# Patient Record
Sex: FEMALE | Race: OTHER | HISPANIC OR LATINO | Employment: PART TIME | URBAN - METROPOLITAN AREA
[De-identification: names, ages, dates, MRNs, and addresses within clinical notes are randomized per-mention and may not be internally consistent; named-entity substitution may affect disease eponyms.]

---

## 2024-05-06 ENCOUNTER — APPOINTMENT (OUTPATIENT)
Dept: RADIOLOGY | Facility: MEDICAL CENTER | Age: 29
DRG: 064 | End: 2024-05-06
Attending: PSYCHIATRY & NEUROLOGY
Payer: COMMERCIAL

## 2024-05-06 ENCOUNTER — HOSPITAL ENCOUNTER (INPATIENT)
Facility: MEDICAL CENTER | Age: 29
LOS: 4 days | DRG: 064 | End: 2024-05-10
Attending: STUDENT IN AN ORGANIZED HEALTH CARE EDUCATION/TRAINING PROGRAM | Admitting: HOSPITALIST
Payer: COMMERCIAL

## 2024-05-06 DIAGNOSIS — I15.9 SECONDARY HYPERTENSION: ICD-10-CM

## 2024-05-06 DIAGNOSIS — I63.9 CEREBROVASCULAR ACCIDENT (CVA), UNSPECIFIED MECHANISM (HCC): ICD-10-CM

## 2024-05-06 PROBLEM — G43.909 MIGRAINES: Status: ACTIVE | Noted: 2024-05-06

## 2024-05-06 PROBLEM — I63.81 THALAMIC STROKE (HCC): Status: ACTIVE | Noted: 2024-05-06

## 2024-05-06 LAB
ALBUMIN SERPL BCP-MCNC: 4.3 G/DL (ref 3.2–4.9)
ALBUMIN/GLOB SERPL: 1.9 G/DL
ALP SERPL-CCNC: 55 U/L (ref 30–99)
ALT SERPL-CCNC: 12 U/L (ref 2–50)
AMPHET UR QL SCN: NEGATIVE
ANION GAP SERPL CALC-SCNC: 10 MMOL/L (ref 7–16)
APTT PPP: 27.1 SEC (ref 24.7–36)
AST SERPL-CCNC: 19 U/L (ref 12–45)
BARBITURATES UR QL SCN: NEGATIVE
BASOPHILS # BLD AUTO: 0.5 % (ref 0–1.8)
BASOPHILS # BLD: 0.04 K/UL (ref 0–0.12)
BENZODIAZ UR QL SCN: NEGATIVE
BILIRUB SERPL-MCNC: 0.8 MG/DL (ref 0.1–1.5)
BUN SERPL-MCNC: 4 MG/DL (ref 8–22)
BZE UR QL SCN: NEGATIVE
CALCIUM ALBUM COR SERPL-MCNC: 8.5 MG/DL (ref 8.5–10.5)
CALCIUM SERPL-MCNC: 8.7 MG/DL (ref 8.5–10.5)
CANNABINOIDS UR QL SCN: POSITIVE
CHLORIDE SERPL-SCNC: 107 MMOL/L (ref 96–112)
CO2 SERPL-SCNC: 21 MMOL/L (ref 20–33)
CREAT SERPL-MCNC: 0.49 MG/DL (ref 0.5–1.4)
EOSINOPHIL # BLD AUTO: 0.11 K/UL (ref 0–0.51)
EOSINOPHIL NFR BLD: 1.4 % (ref 0–6.9)
ERYTHROCYTE [DISTWIDTH] IN BLOOD BY AUTOMATED COUNT: 39.2 FL (ref 35.9–50)
EST. AVERAGE GLUCOSE BLD GHB EST-MCNC: 100 MG/DL
FENTANYL UR QL: NEGATIVE
GFR SERPLBLD CREATININE-BSD FMLA CKD-EPI: 130 ML/MIN/1.73 M 2
GLOBULIN SER CALC-MCNC: 2.3 G/DL (ref 1.9–3.5)
GLUCOSE SERPL-MCNC: 86 MG/DL (ref 65–99)
HBA1C MFR BLD: 5.1 % (ref 4–5.6)
HCT VFR BLD AUTO: 39 % (ref 37–47)
HGB BLD-MCNC: 14 G/DL (ref 12–16)
IMM GRANULOCYTES # BLD AUTO: 0.04 K/UL (ref 0–0.11)
IMM GRANULOCYTES NFR BLD AUTO: 0.5 % (ref 0–0.9)
INR PPP: 1.01 (ref 0.87–1.13)
LYMPHOCYTES # BLD AUTO: 1.9 K/UL (ref 1–4.8)
LYMPHOCYTES NFR BLD: 23.7 % (ref 22–41)
MAGNESIUM SERPL-MCNC: 1.9 MG/DL (ref 1.5–2.5)
MCH RBC QN AUTO: 31 PG (ref 27–33)
MCHC RBC AUTO-ENTMCNC: 35.9 G/DL (ref 32.2–35.5)
MCV RBC AUTO: 86.3 FL (ref 81.4–97.8)
METHADONE UR QL SCN: NEGATIVE
MONOCYTES # BLD AUTO: 0.61 K/UL (ref 0–0.85)
MONOCYTES NFR BLD AUTO: 7.6 % (ref 0–13.4)
NEUTROPHILS # BLD AUTO: 5.31 K/UL (ref 1.82–7.42)
NEUTROPHILS NFR BLD: 66.3 % (ref 44–72)
NRBC # BLD AUTO: 0 K/UL
NRBC BLD-RTO: 0 /100 WBC (ref 0–0.2)
OPIATES UR QL SCN: NEGATIVE
OXYCODONE UR QL SCN: NEGATIVE
PCP UR QL SCN: NEGATIVE
PHOSPHATE SERPL-MCNC: 3.1 MG/DL (ref 2.5–4.5)
PLATELET # BLD AUTO: 214 K/UL (ref 164–446)
PMV BLD AUTO: 11.2 FL (ref 9–12.9)
POTASSIUM SERPL-SCNC: 3.2 MMOL/L (ref 3.6–5.5)
PROPOXYPH UR QL SCN: NEGATIVE
PROT SERPL-MCNC: 6.6 G/DL (ref 6–8.2)
PROTHROMBIN TIME: 13.4 SEC (ref 12–14.6)
RBC # BLD AUTO: 4.52 M/UL (ref 4.2–5.4)
SODIUM SERPL-SCNC: 138 MMOL/L (ref 135–145)
UFH PPP CHRO-ACNC: <0.1 IU/ML
WBC # BLD AUTO: 8 K/UL (ref 4.8–10.8)

## 2024-05-06 PROCEDURE — 99223 1ST HOSP IP/OBS HIGH 75: CPT | Performed by: HOSPITALIST

## 2024-05-06 PROCEDURE — 99358 PROLONG SERVICE W/O CONTACT: CPT | Performed by: HOSPITALIST

## 2024-05-06 PROCEDURE — 99222 1ST HOSP IP/OBS MODERATE 55: CPT | Performed by: PSYCHIATRY & NEUROLOGY

## 2024-05-06 RX ORDER — POTASSIUM CHLORIDE 20 MEQ/1
40 TABLET, EXTENDED RELEASE ORAL ONCE
Status: DISCONTINUED | OUTPATIENT
Start: 2024-05-06 | End: 2024-05-07

## 2024-05-06 RX ORDER — LABETALOL HYDROCHLORIDE 5 MG/ML
10 INJECTION, SOLUTION INTRAVENOUS EVERY 4 HOURS PRN
Status: DISCONTINUED | OUTPATIENT
Start: 2024-05-06 | End: 2024-05-10 | Stop reason: HOSPADM

## 2024-05-06 RX ORDER — HEPARIN SODIUM 5000 [USP'U]/100ML
0-30 INJECTION, SOLUTION INTRAVENOUS CONTINUOUS
Status: DISCONTINUED | OUTPATIENT
Start: 2024-05-06 | End: 2024-05-08

## 2024-05-06 RX ADMIN — IOHEXOL 80 ML: 350 INJECTION, SOLUTION INTRAVENOUS at 22:45

## 2024-05-06 RX ADMIN — HEPARIN SODIUM 12 UNITS/KG/HR: 5000 INJECTION, SOLUTION INTRAVENOUS at 22:00

## 2024-05-06 ASSESSMENT — ENCOUNTER SYMPTOMS
DIAPHORESIS: 0
DEPRESSION: 0
BLOOD IN STOOL: 0
NECK PAIN: 0
ORTHOPNEA: 0
FALLS: 0
WEAKNESS: 1
WHEEZING: 0
DIARRHEA: 0
BACK PAIN: 0
TREMORS: 0
HALLUCINATIONS: 0
HEADACHES: 1
SINUS PAIN: 0
HEARTBURN: 0
PALPITATIONS: 0
FLANK PAIN: 0
SORE THROAT: 0
VOMITING: 0
FEVER: 0
ABDOMINAL PAIN: 0
CONSTIPATION: 0
DOUBLE VISION: 0
POLYDIPSIA: 0
SENSORY CHANGE: 1
HEMOPTYSIS: 0
BLURRED VISION: 0
EYE PAIN: 0
TINGLING: 0
COUGH: 0
BRUISES/BLEEDS EASILY: 0
DIZZINESS: 1
CLAUDICATION: 0
NAUSEA: 0
PHOTOPHOBIA: 0
SHORTNESS OF BREATH: 0
STRIDOR: 0
MYALGIAS: 0
SPUTUM PRODUCTION: 0
CHILLS: 0
PND: 0

## 2024-05-06 ASSESSMENT — COGNITIVE AND FUNCTIONAL STATUS - GENERAL
WALKING IN HOSPITAL ROOM: A LITTLE
MOVING TO AND FROM BED TO CHAIR: A LITTLE
STANDING UP FROM CHAIR USING ARMS: A LITTLE
EATING MEALS: A LITTLE
DAILY ACTIVITIY SCORE: 20
HELP NEEDED FOR BATHING: A LITTLE
CLIMB 3 TO 5 STEPS WITH RAILING: A LITTLE
SUGGESTED CMS G CODE MODIFIER DAILY ACTIVITY: CJ
MOVING FROM LYING ON BACK TO SITTING ON SIDE OF FLAT BED: A LITTLE
PERSONAL GROOMING: A LITTLE
SUGGESTED CMS G CODE MODIFIER MOBILITY: CK
DRESSING REGULAR LOWER BODY CLOTHING: A LITTLE
MOBILITY SCORE: 19

## 2024-05-06 ASSESSMENT — LIFESTYLE VARIABLES
AVERAGE NUMBER OF DAYS PER WEEK YOU HAVE A DRINK CONTAINING ALCOHOL: 0
EVER FELT BAD OR GUILTY ABOUT YOUR DRINKING: NO
ON A TYPICAL DAY WHEN YOU DRINK ALCOHOL HOW MANY DRINKS DO YOU HAVE: 0
EVER HAD A DRINK FIRST THING IN THE MORNING TO STEADY YOUR NERVES TO GET RID OF A HANGOVER: NO
CONSUMPTION TOTAL: NEGATIVE
TOTAL SCORE: 0
TOTAL SCORE: 0
HAVE YOU EVER FELT YOU SHOULD CUT DOWN ON YOUR DRINKING: NO
DOES PATIENT WANT TO STOP DRINKING: NO
HOW MANY TIMES IN THE PAST YEAR HAVE YOU HAD 5 OR MORE DRINKS IN A DAY: 0
ALCOHOL_USE: NO
HAVE PEOPLE ANNOYED YOU BY CRITICIZING YOUR DRINKING: NO
SUBSTANCE_ABUSE: 0
TOTAL SCORE: 0

## 2024-05-06 ASSESSMENT — PATIENT HEALTH QUESTIONNAIRE - PHQ9
SUM OF ALL RESPONSES TO PHQ9 QUESTIONS 1 AND 2: 0
1. LITTLE INTEREST OR PLEASURE IN DOING THINGS: NOT AT ALL
2. FEELING DOWN, DEPRESSED, IRRITABLE, OR HOPELESS: NOT AT ALL

## 2024-05-07 ENCOUNTER — APPOINTMENT (OUTPATIENT)
Dept: RADIOLOGY | Facility: MEDICAL CENTER | Age: 29
DRG: 064 | End: 2024-05-07
Attending: HOSPITALIST
Payer: COMMERCIAL

## 2024-05-07 ENCOUNTER — APPOINTMENT (OUTPATIENT)
Dept: CARDIOLOGY | Facility: MEDICAL CENTER | Age: 29
DRG: 064 | End: 2024-05-07
Attending: HOSPITALIST
Payer: COMMERCIAL

## 2024-05-07 ENCOUNTER — HOSPITAL ENCOUNTER (INPATIENT)
Facility: REHABILITATION | Age: 29
End: 2024-05-07
Attending: HOSPITALIST | Admitting: HOSPITALIST

## 2024-05-07 PROBLEM — I10 HYPERTENSION: Status: ACTIVE | Noted: 2024-05-07

## 2024-05-07 PROBLEM — E87.6 HYPOKALEMIA: Status: ACTIVE | Noted: 2024-05-07

## 2024-05-07 LAB
ALBUMIN SERPL BCP-MCNC: 4.1 G/DL (ref 3.2–4.9)
ALBUMIN/GLOB SERPL: 1.9 G/DL
ALP SERPL-CCNC: 50 U/L (ref 30–99)
ALT SERPL-CCNC: 11 U/L (ref 2–50)
ANION GAP SERPL CALC-SCNC: 11 MMOL/L (ref 7–16)
AST SERPL-CCNC: 18 U/L (ref 12–45)
BASOPHILS # BLD AUTO: 0.4 % (ref 0–1.8)
BASOPHILS # BLD: 0.03 K/UL (ref 0–0.12)
BILIRUB SERPL-MCNC: 0.9 MG/DL (ref 0.1–1.5)
BUN SERPL-MCNC: 4 MG/DL (ref 8–22)
CALCIUM ALBUM COR SERPL-MCNC: 8.4 MG/DL (ref 8.5–10.5)
CALCIUM SERPL-MCNC: 8.5 MG/DL (ref 8.5–10.5)
CHLORIDE SERPL-SCNC: 107 MMOL/L (ref 96–112)
CHOLEST SERPL-MCNC: 121 MG/DL (ref 100–199)
CO2 SERPL-SCNC: 20 MMOL/L (ref 20–33)
CREAT SERPL-MCNC: 0.44 MG/DL (ref 0.5–1.4)
EOSINOPHIL # BLD AUTO: 0.1 K/UL (ref 0–0.51)
EOSINOPHIL NFR BLD: 1.4 % (ref 0–6.9)
ERYTHROCYTE [DISTWIDTH] IN BLOOD BY AUTOMATED COUNT: 39.5 FL (ref 35.9–50)
GFR SERPLBLD CREATININE-BSD FMLA CKD-EPI: 133 ML/MIN/1.73 M 2
GLOBULIN SER CALC-MCNC: 2.2 G/DL (ref 1.9–3.5)
GLUCOSE SERPL-MCNC: 87 MG/DL (ref 65–99)
HCT VFR BLD AUTO: 39.2 % (ref 37–47)
HDLC SERPL-MCNC: 61 MG/DL
HGB BLD-MCNC: 14.2 G/DL (ref 12–16)
IMM GRANULOCYTES # BLD AUTO: 0.02 K/UL (ref 0–0.11)
IMM GRANULOCYTES NFR BLD AUTO: 0.3 % (ref 0–0.9)
LDLC SERPL CALC-MCNC: 53 MG/DL
LV EJECT FRACT MOD 2C 99903: 72.63
LV EJECT FRACT MOD 4C 99902: 59.11
LV EJECT FRACT MOD BP 99901: 67.33
LYMPHOCYTES # BLD AUTO: 1.86 K/UL (ref 1–4.8)
LYMPHOCYTES NFR BLD: 26.9 % (ref 22–41)
MCH RBC QN AUTO: 31.1 PG (ref 27–33)
MCHC RBC AUTO-ENTMCNC: 36.2 G/DL (ref 32.2–35.5)
MCV RBC AUTO: 85.8 FL (ref 81.4–97.8)
MONOCYTES # BLD AUTO: 0.51 K/UL (ref 0–0.85)
MONOCYTES NFR BLD AUTO: 7.4 % (ref 0–13.4)
NEUTROPHILS # BLD AUTO: 4.39 K/UL (ref 1.82–7.42)
NEUTROPHILS NFR BLD: 63.6 % (ref 44–72)
NRBC # BLD AUTO: 0 K/UL
NRBC BLD-RTO: 0 /100 WBC (ref 0–0.2)
PLATELET # BLD AUTO: 205 K/UL (ref 164–446)
PMV BLD AUTO: 11.5 FL (ref 9–12.9)
POTASSIUM SERPL-SCNC: 3.2 MMOL/L (ref 3.6–5.5)
PROT SERPL-MCNC: 6.3 G/DL (ref 6–8.2)
RBC # BLD AUTO: 4.57 M/UL (ref 4.2–5.4)
SODIUM SERPL-SCNC: 138 MMOL/L (ref 135–145)
TRIGL SERPL-MCNC: 35 MG/DL (ref 0–149)
UFH PPP CHRO-ACNC: 0.42 IU/ML
UFH PPP CHRO-ACNC: 0.44 IU/ML
WBC # BLD AUTO: 6.9 K/UL (ref 4.8–10.8)

## 2024-05-07 PROCEDURE — 99232 SBSQ HOSP IP/OBS MODERATE 35: CPT | Performed by: PSYCHIATRY & NEUROLOGY

## 2024-05-07 PROCEDURE — 93306 TTE W/DOPPLER COMPLETE: CPT | Mod: 26 | Performed by: INTERNAL MEDICINE

## 2024-05-07 PROCEDURE — 99233 SBSQ HOSP IP/OBS HIGH 50: CPT | Performed by: HOSPITALIST

## 2024-05-07 PROCEDURE — 99223 1ST HOSP IP/OBS HIGH 75: CPT | Performed by: PHYSICAL MEDICINE & REHABILITATION

## 2024-05-07 RX ORDER — POTASSIUM CHLORIDE 7.45 MG/ML
10 INJECTION INTRAVENOUS
Status: COMPLETED | OUTPATIENT
Start: 2024-05-07 | End: 2024-05-07

## 2024-05-07 RX ORDER — LOSARTAN POTASSIUM 50 MG/1
25 TABLET ORAL
Status: DISCONTINUED | OUTPATIENT
Start: 2024-05-07 | End: 2024-05-08

## 2024-05-07 RX ORDER — HYDRALAZINE HYDROCHLORIDE 20 MG/ML
20 INJECTION INTRAMUSCULAR; INTRAVENOUS EVERY 6 HOURS PRN
Status: DISCONTINUED | OUTPATIENT
Start: 2024-05-07 | End: 2024-05-10 | Stop reason: HOSPADM

## 2024-05-07 RX ADMIN — LABETALOL HYDROCHLORIDE 10 MG: 5 INJECTION INTRAVENOUS at 09:57

## 2024-05-07 RX ADMIN — POTASSIUM CHLORIDE 10 MEQ: 10 INJECTION, SOLUTION INTRAVENOUS at 10:02

## 2024-05-07 RX ADMIN — POTASSIUM CHLORIDE 10 MEQ: 10 INJECTION, SOLUTION INTRAVENOUS at 08:55

## 2024-05-07 RX ADMIN — LABETALOL HYDROCHLORIDE 10 MG: 5 INJECTION INTRAVENOUS at 05:56

## 2024-05-07 RX ADMIN — LOSARTAN POTASSIUM 25 MG: 50 TABLET, FILM COATED ORAL at 15:40

## 2024-05-07 RX ADMIN — POTASSIUM CHLORIDE 10 MEQ: 10 INJECTION, SOLUTION INTRAVENOUS at 03:55

## 2024-05-07 RX ADMIN — POTASSIUM CHLORIDE 10 MEQ: 10 INJECTION, SOLUTION INTRAVENOUS at 02:10

## 2024-05-07 ASSESSMENT — COGNITIVE AND FUNCTIONAL STATUS - GENERAL
DAILY ACTIVITIY SCORE: 21
SUGGESTED CMS G CODE MODIFIER DAILY ACTIVITY: CJ
HELP NEEDED FOR BATHING: A LITTLE
MOVING TO AND FROM BED TO CHAIR: A LITTLE
TOILETING: A LITTLE
SUGGESTED CMS G CODE MODIFIER MOBILITY: CJ
CLIMB 3 TO 5 STEPS WITH RAILING: A LITTLE
WALKING IN HOSPITAL ROOM: A LITTLE
DRESSING REGULAR LOWER BODY CLOTHING: A LITTLE
STANDING UP FROM CHAIR USING ARMS: A LITTLE
MOBILITY SCORE: 20

## 2024-05-07 ASSESSMENT — ENCOUNTER SYMPTOMS
BLURRED VISION: 1
FEVER: 0
FOCAL WEAKNESS: 1
HEADACHES: 1

## 2024-05-07 ASSESSMENT — GAIT ASSESSMENTS: GAIT LEVEL OF ASSIST: UNABLE TO PARTICIPATE

## 2024-05-07 ASSESSMENT — PAIN DESCRIPTION - PAIN TYPE
TYPE: ACUTE PAIN
TYPE: ACUTE PAIN

## 2024-05-07 ASSESSMENT — ACTIVITIES OF DAILY LIVING (ADL): TOILETING: INDEPENDENT

## 2024-05-07 ASSESSMENT — FIBROSIS 4 INDEX: FIB4 SCORE: 0.79

## 2024-05-07 NOTE — PROGRESS NOTES
Order for Xa relseased at 1200. This RN called the phlebotomist who says she received the order and will be there when she can.     1323 Xa drawn at 1239. Called to get an update. Sample is still being processed.

## 2024-05-07 NOTE — H&P
Hospital Medicine History & Physical Note    Date of Service  5/6/2024    Primary Care Physician  No primary care provider on file.    Consultants  neurology    Specialist Names:     Code Status  Full Code    Chief Complaint  Right-sided weakness    History of Presenting Illness  Charisma Pablo is a 28 y.o. female who presented 5/6/2024 with past medical history of migraines who presents to the hospital for right-sided weakness, right-sided numbness, right-sided facial weakness, confusion, blurred vision and headaches that started.  This started at 11 AM when she was found by her boyfriend.  Her symptoms then improved and then the started walking outside when she started to slur her words more and unable to move the right side of her body.  Originally she was having trouble opening her left eye and has some left-sided facial droop.  According to her boyfriend all of her symptoms are improving.  He denies any fever, trauma, nausea, vomiting, diarrhea.  She denies any loss consciousness.  Patient does have a family history of heart disease and stroke    Patient was at [facility] where she had a CT and CTA of the head and neck which was negative for acute occlusion.  She had a original MRI without contrast that was negative and another MRI with contrast was repeated.  She had MRI of the brain with contrast contrast that found a focus of diffusion restriction of the left thalamus most compatible with a lacunar infarct.  Patient was started on aspirin, Plavix and statins    Chest x-ray interpreted by me found no acute pulmonary process  EKG interpreted by me found normal sinus rhythm, left axis deviation    WBC 6.5, hemoglobin 14.9, platelets 233, PT 9.9, INR 1.0, PTT 24.3, sodium 140, potassium 3.5, chloride 108, AST 18, ALT 17, BUN 7, creatinine 0.74, glucose 96, calcium 9.4, troponin 1.01, hCG negative, alcohol level negative    I discussed the plan of care with patient.    Review of  Systems  Review of Systems   Constitutional:  Negative for chills, diaphoresis, fever and malaise/fatigue.   HENT:  Negative for congestion, ear discharge, ear pain, hearing loss, nosebleeds, sinus pain, sore throat and tinnitus.    Eyes:  Negative for blurred vision, double vision, photophobia and pain.   Respiratory:  Negative for cough, hemoptysis, sputum production, shortness of breath, wheezing and stridor.    Cardiovascular:  Negative for chest pain, palpitations, orthopnea, claudication, leg swelling and PND.   Gastrointestinal:  Negative for abdominal pain, blood in stool, constipation, diarrhea, heartburn, melena, nausea and vomiting.   Genitourinary:  Negative for dysuria, flank pain, frequency, hematuria and urgency.   Musculoskeletal:  Negative for back pain, falls, joint pain, myalgias and neck pain.   Skin:  Negative for itching and rash.   Neurological:  Positive for dizziness, sensory change, weakness and headaches. Negative for tingling and tremors.   Endo/Heme/Allergies:  Negative for environmental allergies and polydipsia. Does not bruise/bleed easily.   Psychiatric/Behavioral:  Negative for depression, hallucinations, substance abuse and suicidal ideas.        Past Medical History  Migraines    Surgical History  Surgical history reviewed is not pertinent    Family History  Family history reviewed with patient. There is no family history that is pertinent to the chief complaint.     Social History  Non-smoker, nonalcoholic    Allergies  NKDA    Medications  None       Physical Exam  Temp:  [36.4 °C (97.6 °F)] 36.4 °C (97.6 °F)  Pulse:  [58] 58  Resp:  [18] 18  BP: (141)/(101) 141/101  SpO2:  [95 %] 95 %  Blood Pressure: (!) 141/101 (RN notified )   Temperature: 36.4 °C (97.6 °F)   Pulse: (!) 58 (RN notified )   Respiration: 18   Pulse Oximetry: 95 %       Physical Exam  Vitals and nursing note reviewed.   Constitutional:       General: She is not in acute distress.     Appearance: Normal  "appearance. She is not ill-appearing, toxic-appearing or diaphoretic.   HENT:      Head: Normocephalic and atraumatic.      Nose: No congestion or rhinorrhea.      Mouth/Throat:      Pharynx: No oropharyngeal exudate or posterior oropharyngeal erythema.   Eyes:      General: No scleral icterus.  Neck:      Vascular: No carotid bruit or JVD.   Cardiovascular:      Rate and Rhythm: Normal rate and regular rhythm.      Pulses: Normal pulses.      Heart sounds: Normal heart sounds. No murmur heard.     No friction rub. No gallop.   Pulmonary:      Effort: Pulmonary effort is normal. No respiratory distress.      Breath sounds: No stridor. No wheezing, rhonchi or rales.   Abdominal:      General: Abdomen is flat. There is no distension.      Palpations: There is no mass.      Tenderness: There is no abdominal tenderness. There is no left CVA tenderness, guarding or rebound.      Hernia: No hernia is present.   Musculoskeletal:         General: No swelling. Normal range of motion.      Cervical back: No rigidity. No muscular tenderness.      Right lower leg: No edema.      Left lower leg: No edema.   Lymphadenopathy:      Cervical: No cervical adenopathy.   Skin:     General: Skin is warm and dry.      Capillary Refill: Capillary refill takes more than 3 seconds.      Coloration: Skin is not jaundiced or pale.      Findings: No bruising or erythema.   Neurological:      Mental Status: She is alert. She is disoriented.      Cranial Nerves: Cranial nerve deficit present.      Motor: Weakness present.      Comments: Right-sided facial weakness         Laboratory:          No results for input(s): \"ALTSGPT\", \"ASTSGOT\", \"ALKPHOSPHAT\", \"TBILIRUBIN\", \"DBILIRUBIN\", \"GAMMAGT\", \"AMYLASE\", \"LIPASE\", \"ALB\", \"PREALBUMIN\", \"GLUCOSE\" in the last 72 hours.      No results for input(s): \"NTPROBNP\" in the last 72 hours.      No results for input(s): \"TROPONINT\" in the last 72 hours.    Imaging:  OUTSIDE IMAGES-MR BRAIN   Final Result    "   OUTSIDE IMAGES-MR BRAIN   Final Result      OUTSIDE IMAGES-DX CHEST   Final Result      OUTSIDE IMAGES-CT HEAD   Final Result      OUTSIDE IMAGES-CT CERVICAL SPINE   Final Result      CT-CTA HEAD WITH & W/O-POST PROCESS    (Results Pending)   CT-CTA NECK WITH & W/O-POST PROCESSING    (Results Pending)   EC-ECHOCARDIOGRAM COMPLETE W/O CONT    (Results Pending)       X-Ray:  I have personally reviewed the images and compared with prior images.  EKG:  I have personally reviewed the images and compared with prior images.    Assessment/Plan:  Justification for Admission Status  I anticipate this patient will require at least two midnights for appropriate medical management, necessitating inpatient admission because acute CVA    Patient will need a Telemetry bed on NEUROLOGY service .  The need is secondary to acute CVA.    * Thalamic stroke (HCC)- (present on admission)  Assessment & Plan  I did speak with neurology who suggested that the CTA may have a vertebral artery dissection.  Neurology recommended to start IV heparin without bolus or rebolus.  Neurology wanted to repeat a stat CTA and CTA head and neck  Patient will be placed on continuous cardiac monitoring to evaluate for any arrhythmias  Q4 hours neuro checks  Check 2-D echo  Blood pressure goal 100-160  Check TSH, lipid panel and hemoglobin A1c  PTOT and ST evaluation      Migraines  Assessment & Plan  Patient is not on a maintenance medication        VTE prophylaxis: SCDs/TEDs      I spent a total of 35 minutes of non face to face time performing additional research, reviewing medical records from transferring facility, discussing plan of care with other healthcare providers. Start time: 7 45 pm. End time: 8:20 pm

## 2024-05-07 NOTE — THERAPY
Physical Therapy   Initial Evaluation     Patient Name: Charisma Pablo  Age:  28 y.o., Sex:  female  Medical Record #: [id number]  Today's Date: 5/7/2024     Precautions  Precautions: Fall Risk;Swallow Precautions  Comments: -160    Assessment    Patient is a 28-y.o. who presented to acute on 5/6/24 as a transfer from [facility] with R-sided weakness, R-sided numbness, confusion, blurred vision, and headaches. MRI brain showed a focus of diffusion restriction in the left thalamus compatible with a lacunar infarct. Pt has PMHx significant for migraines.     Pt presents to acute physical therapy with strength deficits and decreased activity tolerance. Pt mobilized as detailed below. Pt was able to complete bed mobility activities with supervision and transferred to a chair with supervision. Pt limited in mobility today due to elevated BP outside of SBP parameters. Deferred ambulation at this time. Of note, pt was ambulating in halls with RN staff prior to visit. Given pt's age, prior level of function, and presentation, anticipate pt is capable of functional independence. Pt reported no concerns with mobility. Anticipate pt will not need further physical therapy needs following discharge. Patient will not be actively followed for physical therapy services at this time, however may be seen if requested by physician for 1 more visit within 30 days to address any discharge or equipment needs.    Plan    Physical Therapy Initial Treatment Plan   Duration: Evaluation only    DC Equipment Recommendations: Unable to determine at this time  Discharge Recommendations: Anticipate that the patient will have no further physical therapy needs after discharge from the hospital       Subjective    RN cleared pt for visit. Pt received in bed. Pt agreeable to PT.     Objective       05/07/24 0950   Cosignature   Documentation Review Approved with modifications made by preceptor in flowsheet   Charge Group   PT  "Evaluation PT Evaluation Mod   Total Time Spent   PT Total Time Yes   PT Evaluation Time Spent (Mins) 19   PT Total Time Spent (Calculated) 19    Services   Is patient using  services for this encounter? No   Precautions   Precautions Fall Risk;Swallow Precautions   Comments -160   Vitals   Pulse 71   Patient BP Position Supine   Blood Pressure (!) 158/105   Pulse Oximetry 99 %   O2 (LPM) 0   O2 Delivery Device None - Room Air   Vitals Comments BP, HR respectively: 158/105mmHg, 71 bpm supine at rest; 181/117mmHg, 74bpm seated EOB; 175/114 mmHg, 77 bpm standing at rest. Deferred further ambulation due to SBP outside parameters.   Pain 0 - 10 Group   Therapist Pain Assessment Nurse Notified;Post Activity Pain Same as Prior to Activity;0   Prior Living Situation   Housing / Facility 1 Story Apartment / Condo   Steps Into Home 0   Steps In Home 0   Rail None   Bathroom Set up Walk In Shower;Grab Bars   Equipment Owned None   Lives with - Patient's Self Care Capacity Alone and Able to Care For Self;Significant Other   Prior Level of Functional Mobility   Bed Mobility Independent   Transfer Status Independent   Ambulation Independent   Ambulation Distance Community   Assistive Devices Used None   Stairs Independent   Comments Pt works at \"[employer]\" and reports still being able to drive.   Cognition    Cognition / Consciousness WDL   Level of Consciousness Alert   Comments pleasant, cooperative   Active ROM Upper Body   Active ROM Upper Body  WDL   Comments Pt moved BUE functionally.   Active ROM Lower Body    Active ROM Lower Body  WDL   Strength Lower Body   Lower Body Strength  WDL   Comments RLE mildly weaker than LLE grossly.  (Simultaneous filing. User may not have seen previous data.)   Coordination Lower Body    Coordination Lower Body  WDL   Comments Bilateral heel-to-shin WFL   Balance Assessment   Sitting Balance (Static) Good   Sitting Balance (Dynamic) Good   Standing " Balance (Static) Fair +   Standing Balance (Dynamic) Fair   Weight Shift Sitting Good   Weight Shift Standing Good   Comments No AD used. No overt LOB.   Bed Mobility    Supine to Sit Supervised   Sit to Supine Supervised   Scooting Supervised   Rolling Supervised   Comments HOB flat, no bed rails used.   Gait Analysis   Gait Level Of Assist Unable to Participate   Weight Bearing Status No restrictions   Vision Deficits Impacting Mobility NT   Comments Deferred ambulation due to SBP outside of BP parameters. Of note, pt was ambulating with RN staff prior to visit without difficulties.   Functional Mobility   Sit to Stand Supervised   Bed, Chair, Wheelchair Transfer Supervised   Transfer Method Stand Step   Mobility Supine > EOB > stand > chair   6 Clicks Assessment - How much HELP from from another person do you currently need... (If the patient hasn't done an activity recently, how much help from another person do you think he/she would need if he/she tried?)   Turning from your back to your side while in a flat bed without using bedrails? 4   Moving from lying on your back to sitting on the side of a flat bed without using bedrails? 4   Moving to and from a bed to a chair (including a wheelchair)? 3   Standing up from a chair using your arms (e.g., wheelchair, or bedside chair)? 3   Walking in hospital room? 3   Climbing 3-5 steps with a railing? 3   6 clicks Mobility Score 20   Education Group   Education Provided Role of Physical Therapist   Role of Physical Therapist Patient Response Patient;Significant Other;Acceptance;Explanation;Verbal Demonstration   Physical Therapy Initial Treatment Plan    Duration Evaluation only   Problem List    Problems Impaired Ambulation   Anticipated Discharge Equipment and Recommendations   DC Equipment Recommendations Unable to determine at this time   Discharge Recommendations Anticipate that the patient will have no further physical therapy needs after discharge from the  hospital   Interdisciplinary Plan of Care Collaboration   IDT Collaboration with  Nursing   Patient Position at End of Therapy Seated;Chair Alarm On;Call Light within Reach;Phone within Reach;Tray Table within Reach;Family / Friend in Room   Collaboration Comments RN updated.   Session Information   Date / Session Number  5/7 - Evaluation only

## 2024-05-07 NOTE — DISCHARGE PLANNING
[facility]    Referral from: Kimberley Schulte MD   Insurance Provider on Facesheet:none noted  Potential Rehab Diagnosis: stroke NIH (6)    Chart review indicates patient may have on going medical management and may have therapy needs to possibly meet inpatient rehab facility criteria with the goal of returning to community.    D/C support: unknown     Physiatry consultation forwarded per protocol.   Would appreciate therapy evaluations once medically appropriate.    TCC following          Thank you for the referral.

## 2024-05-07 NOTE — CARE PLAN
The patient is Stable - Low risk of patient condition declining or worsening    Shift Goals  Clinical Goals: Monitor neuro status, SBP <160  Patient Goals: Rest  Family Goals: Speak to neurologist    Progress made toward(s) clinical / shift goals: Assumed care of patient at 0715. Patient is oriented to self only. Complains of blurry vision in her left eye. Q4 neuro checks in place. Speech therapy advanced her diet to regular. Family at bedside. Updated patient and family on plan of care. Bed alarm/chair alarm are on and call light is within reach.     Problem: Hemodynamic Monitoring  Goal: Patient's hemodynamics, fluid balance and neurologic status will be stable or improve  Outcome: Progressing  Note: Administered PRN blood pressure medications per MAR to maintain blood pressure less than 160 systolic.      Problem: Neuro Status  Goal: Neuro status will remain stable or improve  Outcome: Progressing  Note: Q4 hour neuro checks in place. Patient is alert and oriented to self (sometimes to place). Slight right sided facial droop noted. Right sided weakness is improved. Expressive aphasia noted. NIH completed with outgoing RN. Neuro status remained stable.      Problem: Mobility - Stroke  Goal: Patient's capacity to carry out activities will improve  Outcome: Progressing  Note: Mobilized patient to the chair and bathroom as a standby assist. Patient tolerates ambulation well.        Patient is not progressing towards the following goals: N/A

## 2024-05-07 NOTE — CONSULTS
Physical Medicine and Rehabilitation Consultation              Date of initial consultation: 5/7/2024  Requesting provider: ordered by Kimberley Schulte M.D. at 05/07/24 0722    Consulting provider: Emily Dsouza D.O.  Reason for consultation: assess for acute inpatient rehab appropriateness  LOS: 1 Day(s)    Chief complaint: right sided weakness and blurry vision    HPI: The patient is a 28 y.o.  female with a past medical history of migraines;  who presented on 5/6/2024  8:34 PM as a transfer from [facility] with right sided weakness,numbness, and changes in vision. Per documentation, patient symptoms had on set around 11AM on 5/6/24, but then improved while walking around outside. Patient then started to slur her words and her right sided weakness worsened. Upon eval at the outside hospital, CT head negative for acute findings and CTA head negative for acute occlusion. MRI brain showed a focus of diffusion restriction in the left thalamus compatible with a lacunar infarct. Patient was started on ASA + statin+ Plavix and transferred to [facility] for higher level of care.   Upon eval at [facility], neurology was consulted, patient's DAPT was stopped and patient was started on a heparin drip. repeat CTA head and neck showed a partial filling defect in the Left P1 segment with partially occlusion P1 thrombus. In the AM of 5/7, patient had worsening changes in vision and a stat CT head was obtained which showed a stable hypodensity at the left thalamic region, consistent with MRI findings of Left thalamic infarct.     Patient seen and examined at bedside with boyfriend, patient reports that she feels much better. Boyfriend reports that her right sided weakness and significantly improved. Patient denies HA, lightheadedness, dizziness. Reports vision has returned to baseline and R sided weakness improved. Patient confirms that she does not have insurance, wants to get set up with [employer].     Social Hx:  Patient  lives with her boyfriend in [facility] in a 1 story house    1 LUZ  At prior level of function patient was Independent with mobility and ADLs.     Employment: retail and   Tobacco: Denies   Alcohol: Denies   Drugs: occasional marijuana use     THERAPY:  Restrictions: Fall Risk   PT: Functional mobility   Pending     OT: ADLs  Pending     SLP:   5/7 Regular diet and thin liquids     IMAGING:  CT-HEAD W/O  Narrative: 5/7/2024 9:20 AM    HISTORY/REASON FOR EXAM:  Blurred vision. Right-sided weakness.    TECHNIQUE/EXAM DESCRIPTION AND NUMBER OF VIEWS:  CT of the head without contrast.    The study was performed on a helical multidetector CT scanner. Contiguous axial sections were obtained from the skull base through the vertex.    Up to date radiation dose reduction adjustments have been utilized to meet ALARA standards for radiation dose reduction.    COMPARISON:  CT head, 5/6/2024. MRI brain, 5/6/2024.    FINDINGS:  Stable hypodensity in the left thalamus, compatible with known left thalamic infarct. Stable symmetric prominence of the extra axial CSF spaces. The ventricular system is not dilated. Gray-white matter differentiation is otherwise preserved. Posterior   fossa structures are within normal limits. No mass or mass effect. No acute intracranial hemorrhage. Orbital structures are within normal limits. Paranasal sinuses, middle ear cavities and mastoid air cells are clear.  Impression: 1. Stable hypodensity in the left thalamus, compatible with known left thalamic infarct.  2. No other acute intracranial abnormality.        PROCEDURES:  None     PMH:  No past medical history on file.    PSH:  No past surgical history on file.    FHX:  No family history on file.    Medications:  Current Facility-Administered Medications   Medication Dose    potassium chloride (KCL) ivpb 10 mEq  10 mEq    heparin infusion 25,000 units in 500 mL 0.45% NACL  0-30 Units/kg/hr    labetalol (Normodyne/Trandate) injection 10 mg   "10 mg       Allergies:  Not on File      Physical Exam:  Vitals: BP (!) 137/98   Pulse 63   Temp 36.3 °C (97.4 °F) (Temporal)   Resp 16   Ht 1.549 m (5' 0.98\")   Wt 59.2 kg (130 lb 8.2 oz)   SpO2 99%   Gen: NAD, seated comfortably in recliner, boyfriend in room   Head:  NC/AT  Eyes/ Nose/ Mouth: PERRLA, moist mucous membranes  Cardio: RRR, good distal perfusion, warm extremities  Pulm: normal respiratory effort, no cyanosis   Abd: Soft NTND, negative borborygmi   Ext: No peripheral edema. No calf tenderness. No clubbing.    Mental status:  A&Ox4 (person, place, date, situation), states the year as 2023, but attempts to self correct,  answers questions appropriately follows commands  Speech: fluent, no aphasia or dysarthria, has some difficulty name hospital, but is able to pick it correctly from a list     CRANIAL NERVES:  2,3: visual acuity grossly intact, PERRL  3,4,6: EOMI bilaterally, no nystagmus or diplopia  5: sensation intact to light touch bilaterally and symmetric  7: no obvious facial asymmetry  8: hearing grossly intact    Motor:      Upper Extremity  Myotome R L   Shoulder flexion C5 5/5 5/5   Elbow flexion C5 5/5 5/5   Wrist extension C6 5/5 5/5   Elbow extension C7 5/5 5/5   Finger flexion C8 5/5 5/5   Finger abduction T1 5/5 5/5     Lower Extremity Myotome R L   Hip flexion L2 5/5 5/5   Knee extension L3 5/5 5/5   Ankle dorsiflexion L4 5/5 5/5   Toe extension L5 5/5 5/5   Ankle plantarflexion S1 5/5 5/5       Negative Pronator drift bilaterally    Sensory:   intact to light touch through out    DTRs: 2+ in bilateral  biceps  No clonus at bilateral ankles  Negative White b/l     Tone: no spasticity noted    Coordination:   intact finger to nose bilaterally      Labs: Reviewed and significant for   Recent Labs     05/06/24 2144 05/07/24  0058   RBC 4.52 4.57   HEMOGLOBIN 14.0 14.2   HEMATOCRIT 39.0 39.2   PLATELETCT 214 205   PROTHROMBTM 13.4  --    APTT 27.1  --    INR 1.01  --      Recent " Labs     05/06/24  2144 05/07/24  0058   SODIUM 138 138   POTASSIUM 3.2* 3.2*   CHLORIDE 107 107   CO2 21 20   GLUCOSE 86 87   BUN 4* 4*   CREATININE 0.49* 0.44*   CALCIUM 8.7 8.5     Recent Results (from the past 24 hour(s))   CBC WITH DIFFERENTIAL    Collection Time: 05/06/24  9:44 PM   Result Value Ref Range    WBC 8.0 4.8 - 10.8 K/uL    RBC 4.52 4.20 - 5.40 M/uL    Hemoglobin 14.0 12.0 - 16.0 g/dL    Hematocrit 39.0 37.0 - 47.0 %    MCV 86.3 81.4 - 97.8 fL    MCH 31.0 27.0 - 33.0 pg    MCHC 35.9 (H) 32.2 - 35.5 g/dL    RDW 39.2 35.9 - 50.0 fL    Platelet Count 214 164 - 446 K/uL    MPV 11.2 9.0 - 12.9 fL    Neutrophils-Polys 66.30 44.00 - 72.00 %    Lymphocytes 23.70 22.00 - 41.00 %    Monocytes 7.60 0.00 - 13.40 %    Eosinophils 1.40 0.00 - 6.90 %    Basophils 0.50 0.00 - 1.80 %    Immature Granulocytes 0.50 0.00 - 0.90 %    Nucleated RBC 0.00 0.00 - 0.20 /100 WBC    Neutrophils (Absolute) 5.31 1.82 - 7.42 K/uL    Lymphs (Absolute) 1.90 1.00 - 4.80 K/uL    Monos (Absolute) 0.61 0.00 - 0.85 K/uL    Eos (Absolute) 0.11 0.00 - 0.51 K/uL    Baso (Absolute) 0.04 0.00 - 0.12 K/uL    Immature Granulocytes (abs) 0.04 0.00 - 0.11 K/uL    NRBC (Absolute) 0.00 K/uL   Comp Metabolic Panel    Collection Time: 05/06/24  9:44 PM   Result Value Ref Range    Sodium 138 135 - 145 mmol/L    Potassium 3.2 (L) 3.6 - 5.5 mmol/L    Chloride 107 96 - 112 mmol/L    Co2 21 20 - 33 mmol/L    Anion Gap 10.0 7.0 - 16.0    Glucose 86 65 - 99 mg/dL    Bun 4 (L) 8 - 22 mg/dL    Creatinine 0.49 (L) 0.50 - 1.40 mg/dL    Calcium 8.7 8.5 - 10.5 mg/dL    Correct Calcium 8.5 8.5 - 10.5 mg/dL    AST(SGOT) 19 12 - 45 U/L    ALT(SGPT) 12 2 - 50 U/L    Alkaline Phosphatase 55 30 - 99 U/L    Total Bilirubin 0.8 0.1 - 1.5 mg/dL    Albumin 4.3 3.2 - 4.9 g/dL    Total Protein 6.6 6.0 - 8.2 g/dL    Globulin 2.3 1.9 - 3.5 g/dL    A-G Ratio 1.9 g/dL   MAGNESIUM    Collection Time: 05/06/24  9:44 PM   Result Value Ref Range    Magnesium 1.9 1.5 - 2.5 mg/dL    PHOSPHORUS    Collection Time: 05/06/24  9:44 PM   Result Value Ref Range    Phosphorus 3.1 2.5 - 4.5 mg/dL   APTT    Collection Time: 05/06/24  9:44 PM   Result Value Ref Range    APTT 27.1 24.7 - 36.0 sec   Prothrombin Time    Collection Time: 05/06/24  9:44 PM   Result Value Ref Range    PT 13.4 12.0 - 14.6 sec    INR 1.01 0.87 - 1.13   Heparin Anti-Xa    Collection Time: 05/06/24  9:44 PM   Result Value Ref Range    Heparin Xa (UFH) <0.10 IU/mL   HEMOGLOBIN A1C    Collection Time: 05/06/24  9:44 PM   Result Value Ref Range    Glycohemoglobin 5.1 4.0 - 5.6 %    Est Avg Glucose 100 mg/dL   ESTIMATED GFR    Collection Time: 05/06/24  9:44 PM   Result Value Ref Range    GFR (CKD-EPI) 130 >60 mL/min/1.73 m 2   URINE DRUG SCREEN    Collection Time: 05/06/24 10:24 PM   Result Value Ref Range    Amphetamines Urine Negative Negative    Barbiturates Negative Negative    Benzodiazepines Negative Negative    Cocaine Metabolite Negative Negative    Fentanyl, Urine Negative Negative    Methadone Negative Negative    Opiates Negative Negative    Oxycodone Negative Negative    Phencyclidine -Pcp Negative Negative    Propoxyphene Negative Negative    Cannabinoid Metab Positive (A) Negative   Lipid Profile    Collection Time: 05/07/24 12:58 AM   Result Value Ref Range    Cholesterol,Tot 121 100 - 199 mg/dL    Triglycerides 35 0 - 149 mg/dL    HDL 61 >=40 mg/dL    LDL 53 <100 mg/dL   CBC with Differential    Collection Time: 05/07/24 12:58 AM   Result Value Ref Range    WBC 6.9 4.8 - 10.8 K/uL    RBC 4.57 4.20 - 5.40 M/uL    Hemoglobin 14.2 12.0 - 16.0 g/dL    Hematocrit 39.2 37.0 - 47.0 %    MCV 85.8 81.4 - 97.8 fL    MCH 31.1 27.0 - 33.0 pg    MCHC 36.2 (H) 32.2 - 35.5 g/dL    RDW 39.5 35.9 - 50.0 fL    Platelet Count 205 164 - 446 K/uL    MPV 11.5 9.0 - 12.9 fL    Neutrophils-Polys 63.60 44.00 - 72.00 %    Lymphocytes 26.90 22.00 - 41.00 %    Monocytes 7.40 0.00 - 13.40 %    Eosinophils 1.40 0.00 - 6.90 %    Basophils 0.40  0.00 - 1.80 %    Immature Granulocytes 0.30 0.00 - 0.90 %    Nucleated RBC 0.00 0.00 - 0.20 /100 WBC    Neutrophils (Absolute) 4.39 1.82 - 7.42 K/uL    Lymphs (Absolute) 1.86 1.00 - 4.80 K/uL    Monos (Absolute) 0.51 0.00 - 0.85 K/uL    Eos (Absolute) 0.10 0.00 - 0.51 K/uL    Baso (Absolute) 0.03 0.00 - 0.12 K/uL    Immature Granulocytes (abs) 0.02 0.00 - 0.11 K/uL    NRBC (Absolute) 0.00 K/uL   Comp Metabolic Panel (CMP)    Collection Time: 05/07/24 12:58 AM   Result Value Ref Range    Sodium 138 135 - 145 mmol/L    Potassium 3.2 (L) 3.6 - 5.5 mmol/L    Chloride 107 96 - 112 mmol/L    Co2 20 20 - 33 mmol/L    Anion Gap 11.0 7.0 - 16.0    Glucose 87 65 - 99 mg/dL    Bun 4 (L) 8 - 22 mg/dL    Creatinine 0.44 (L) 0.50 - 1.40 mg/dL    Calcium 8.5 8.5 - 10.5 mg/dL    Correct Calcium 8.4 (L) 8.5 - 10.5 mg/dL    AST(SGOT) 18 12 - 45 U/L    ALT(SGPT) 11 2 - 50 U/L    Alkaline Phosphatase 50 30 - 99 U/L    Total Bilirubin 0.9 0.1 - 1.5 mg/dL    Albumin 4.1 3.2 - 4.9 g/dL    Total Protein 6.3 6.0 - 8.2 g/dL    Globulin 2.2 1.9 - 3.5 g/dL    A-G Ratio 1.9 g/dL   ESTIMATED GFR    Collection Time: 05/07/24 12:58 AM   Result Value Ref Range    GFR (CKD-EPI) 133 >60 mL/min/1.73 m 2   Heparin Xa (Unfractionated)    Collection Time: 05/07/24  5:15 AM   Result Value Ref Range    Heparin Xa (UFH) 0.44 IU/mL         ASSESSMENT:  Patient is a 28 y.o. female admitted with left thalamic infarct     [facility] Code / Diagnosis to Support: 0001.2 - Stroke: Right Body Involvement (Left Brain)  See DISPO details below for recommendations on appropriate level of rehab for this diagnosis.    Barriers to transfer include: Insurance authorization, TCCs to verify disposition, medical clearance and bed availability     Assessment and Plan:  Left thalamic stroke   - admitted with right sided weakness and changes in vision   - OSH CT head and  CTA head/neck negative   - MRI brain showed showed a Left thalamic stroke   - repeat CTA head/neck showed subtle  partial filling defect of the left P1 segment and a partially occlusionve thrombus   - patient switched off DAPT and started on heparin drip   - repeat CT head on 5/7 showed stable hypodensity in left thalamic region   - PT/OT pending, but deficits improved      Hypokalemia   - 5/7 K 3.2   - primary team repleted and monitoring       DISPO:  - patient is currently functioning below their level of baseline, recommend on going therapy   - patient is not a candidate for IRF due to resolution of symptoms and likely need for HH vs outpatient, also patient not a candidate due to lack of insurance ([employer]) pending   - anticipate patient will continues to make gains to be safe for home with outpatient services   - PM&R will sign off     Medical Complexity:  Left thalamic stroke   Hypokalemia   Impaired mobility and ADLs       DVT PPX: heparin drip       Thank you for allowing us to participate in the care of this patient.     Patient was seen for >80 minutes on unit/floor of which > 50% of time was spent on counseling and coordination of care regarding the above, including prognosis, risk reduction, benefits of treatment, and options for next stage of care.    Emily Dsouza D.O.   Physical Medicine and Rehabilitation     Please note that this dictation was created using voice recognition software. I have made every reasonable attempt to correct obvious errors, but there may be errors of grammar and possibly content that I did not discover before finalizing the note.

## 2024-05-07 NOTE — PROGRESS NOTES
Hospital Medicine Daily Progress Note    Date of Service  5/7/2024    Chief Complaint  Charisma Pablo is a 28 y.o. female admitted 5/6/2024 with blurred vision    Hospital Course  Charisma Pablo is a 28 y.o. female who presented 5/6/2024 with past medical history of migraines who presents to the hospital for right-sided weakness, right-sided numbness, right-sided facial weakness, confusion, blurred vision and headaches that started.  This started at 11 AM when she was found by her boyfriend.  Her symptoms then improved and then the started walking outside when she started to slur her words more and unable to move the right side of her body.  Originally she was having trouble opening her left eye and has some left-sided facial droop.  According to her boyfriend all of her symptoms are improving.  He denies any fever, trauma, nausea, vomiting, diarrhea.  She denies any loss consciousness.  Patient does have a family history of heart disease and stroke     Patient was at [facility] where she had a CT and CTA of the head and neck which was negative for acute occlusion.  She had a original MRI without contrast that was negative and another MRI with contrast was repeated.  She had MRI of the brain with contrast contrast that found a focus of diffusion restriction of the left thalamus most compatible with a lacunar infarct.  Patient was started on aspirin, Plavix and statin and transferred to our facility for higher level of care.  She was evaluated by neurology and upon review of her imaging Dr Stringer was concerned about vertebral artery dissection and the patient was started on heparin drip    Interval Problem Update    Patient complained of increased blurred vision this morning I ordered stat head CT and reviewed it negative for bleed  She remains on heparin drip I discussed with neurology Dr. Stringer  Reviewed chemistry panel potassium 3.2 I ordered repletion  I reviewed lipid panel LDL 53  I reviewed CBC  hemoglobin 14.2 platelets 205  Blood pressure is elevated I ordered as needed IV hydralazine and p.o. losartan    Total time spent today reviewing imaging and lab results examining patient discussing with consultant and updating patient on plan of care 53 minutes    I have discussed this patient's plan of care and discharge plan at IDT rounds today with Case Management, Nursing, Nursing leadership, and other members of the IDT team.    Consultants/Specialty  neurology    Code Status  Full Code    Disposition  <MEDICALLYCLEARED>  I have placed the appropriate orders for post-discharge needs.    Review of Systems  Review of Systems   Constitutional:  Negative for fever.   Eyes:  Positive for blurred vision.   Neurological:  Positive for focal weakness and headaches.        Physical Exam  Temp:  [36.1 °C (97 °F)-36.7 °C (98 °F)] 36.7 °C (98 °F)  Pulse:  [58-86] 66  Resp:  [16-18] 17  BP: (137-178)/() 146/88  SpO2:  [95 %-99 %] 99 %    Physical Exam  Vitals and nursing note reviewed.   Constitutional:       General: She is not in acute distress.  HENT:      Head: Normocephalic and atraumatic.      Nose: Nose normal. No rhinorrhea.      Mouth/Throat:      Pharynx: No oropharyngeal exudate or posterior oropharyngeal erythema.   Eyes:      General:         Right eye: No discharge.         Left eye: No discharge.   Cardiovascular:      Rate and Rhythm: Normal rate and regular rhythm.      Heart sounds: Normal heart sounds. No murmur heard.     No friction rub. No gallop.   Pulmonary:      Effort: Pulmonary effort is normal. No respiratory distress.      Breath sounds: Normal breath sounds. No stridor. No rhonchi or rales.   Chest:      Chest wall: No tenderness.   Abdominal:      General: Bowel sounds are normal. There is no distension.      Palpations: Abdomen is soft. There is no mass.      Tenderness: There is no abdominal tenderness. There is no rebound.   Musculoskeletal:         General: No swelling or  tenderness.      Cervical back: Neck supple. No rigidity.   Skin:     General: Skin is warm and dry.      Coloration: Skin is not cyanotic or jaundiced.      Nails: There is no clubbing.   Neurological:      Mental Status: She is alert and oriented to person, place, and time.      Cranial Nerves: Cranial nerve deficit present.      Motor: Weakness present.      Comments: Mild right facial droop  Right hemiparesis 4+/5   Psychiatric:         Mood and Affect: Mood normal.         Behavior: Behavior normal.         Fluids    Intake/Output Summary (Last 24 hours) at 5/7/2024 1437  Last data filed at 5/7/2024 1000  Gross per 24 hour   Intake 560 ml   Output --   Net 560 ml       Laboratory  Recent Labs     05/06/24 2144 05/07/24  0058   WBC 8.0 6.9   RBC 4.52 4.57   HEMOGLOBIN 14.0 14.2   HEMATOCRIT 39.0 39.2   MCV 86.3 85.8   MCH 31.0 31.1   MCHC 35.9* 36.2*   RDW 39.2 39.5   PLATELETCT 214 205   MPV 11.2 11.5     Recent Labs     05/06/24 2144 05/07/24  0058   SODIUM 138 138   POTASSIUM 3.2* 3.2*   CHLORIDE 107 107   CO2 21 20   GLUCOSE 86 87   BUN 4* 4*   CREATININE 0.49* 0.44*   CALCIUM 8.7 8.5     Recent Labs     05/06/24 2144   APTT 27.1   INR 1.01         Recent Labs     05/07/24 0058   TRIGLYCERIDE 35   HDL 61   LDL 53       Imaging  CT-HEAD W/O   Final Result      1. Stable hypodensity in the left thalamus, compatible with known left thalamic infarct.   2. No other acute intracranial abnormality.         CT-CTA NECK WITH & W/O-POST PROCESSING   Final Result         1.  CT angiogram of the neck within normal limits.         CT-CTA HEAD WITH & W/O-POST PROCESS   Final Result   Addendum (preliminary) 1 of 1   Addendum: There is subtle partial filling defect of the left P1 segment,    appearance favoring partially occlusive P1 thrombus.      These findings were discussed with the patient's clinician, Hitesh Stringer,    on 5/6/2024 11:25 PM.      Final         1.  No large vessel occlusion or aneurysm identified.       OUTSIDE IMAGES-MR BRAIN   Final Result      OUTSIDE IMAGES-MR BRAIN   Final Result      OUTSIDE IMAGES-DX CHEST   Final Result      OUTSIDE IMAGES-CT HEAD   Final Result      OUTSIDE IMAGES-CT CERVICAL SPINE   Final Result      EC-ECHOCARDIOGRAM COMPLETE W/O CONT    (Results Pending)        Assessment/Plan  * Thalamic stroke (HCC)- (present on admission)  Assessment & Plan  I discussed with neurology CT concerning for vertebral artery dissection.    Continue heparin drip  Continue close clinical monitoring with serial neurologic exams  PT OT SLP eval's  Follow-up on echocardiogram  Zio patch on discharge    Hypokalemia  Assessment & Plan  I ordered p.o. repletion and repeat levels    Hypertension  Assessment & Plan  Start losartan  Continue as needed hydralazine and labetalol  Goal -160    Migraines  Assessment & Plan  Patient is not on a maintenance medication         VTE prophylaxis:    therapeutic anticoagulation with weight-based heparin gtt, pharmacy to adjust PRN      I have performed a physical exam and reviewed and updated ROS and Plan today (5/7/2024). In review of yesterday's note (5/6/2024), there are no changes except as documented above.

## 2024-05-07 NOTE — PROGRESS NOTES
"Neurology Progress Note  Neurohospitalist Service, [facility]    Referring Physician: Andrew Lara M.D.      Interval History: No acute events overnight.  Repeat CTA confirms likely L P2 subocclusive thrombus.  Improving neurologic exam    Review of systems: In addition to what is detailed in the HPI and/or updated in the interval history, all other systems reviewed and are negative.    Past Medical History, Past Surgical History and Social History reviewed and unchanged from prior    Medications:    Current Facility-Administered Medications:     potassium chloride (KCL) ivpb 10 mEq, 10 mEq, Intravenous, Q HOUR, Andrew Lara M.D.    heparin infusion 25,000 units in 500 mL 0.45% NACL, 0-30 Units/kg/hr, Intravenous, Continuous, Kimberley Schulte M.D., Last Rate: 14.2 mL/hr at 05/07/24 0712, 12 Units/kg/hr at 05/07/24 0712    labetalol (Normodyne/Trandate) injection 10 mg, 10 mg, Intravenous, Q4HRS PRN, Kimberley Schulte M.D., 10 mg at 05/07/24 0556    Physical Examination:   BP (!) 178/125 Comment: RN notified   Pulse 62   Temp 36.6 °C (97.8 °F) (Temporal)   Resp 18   Ht 1.549 m (5' 0.98\")   Wt 59.2 kg (130 lb 8.2 oz)   SpO2 99%   BMI 24.67 kg/m²       General: Patient is awake and in no acute distress  Neck: There is normal range of motion  CV: Regular rate   Extremities:  Warm, dry, and intact, without peripheral lower extremity edema    NEUROLOGICAL EXAM:       Mental status: Awake, alert and fully oriented  Speech and language: Speech is clear and fluent. More appropriate speech content this AM.  Able to follow midline and distal command  Cranial nerve exam: Visual fields are full. Extraocular muscles are intact. Mild R face droop, improved from last night  Motor exam: There is sustained antigravity with no downward drift in bilateral arms and legs.  Decreased dexterity noted with R hand  Sensory exam: Reacts to tactile in all 4 extremities, no neglect to double stim "   Coordination: No ataxia on bilateral finger-to-nose testing  Gait: Deferred due to patient preference        NIHSS: National Institutes of Health Stroke Scale     [0] 1a:Level of Consciousness           0-alert 1-drowsy   2-stupor   3-coma  [0] 1b:LOC Questions                         0-both  1-one      2-neither  [0] 1c:LOC Commands                       0-both  1-one      2-neither  [0] 2: Best Gaze                      0-nl    1-partial  2-forced  [0] 3: Visual Fields                              0-nl    1-partial  2-complete 3-bilat  [1] 4: Facial Paresis                0-nl    1-minor    2-partial  3-full  MOTOR                                              0-nl  [0] 5: Right Arm                       1-drift  [0] 6: Left Arm                                     2-some effort vs gravity  [0] 7: Right Leg                       3-no effort vs gravity  [0] 8: Left Leg                                      4-no movement                                                              x-untestable  [0] 9: Limb Ataxia                    0-abs   1-1_limb   2-2+_limbs                                                              x-untestable  [0] 10:Sensory                        0-nl    1-partial  2-dense  [1] 11:Best Language/Aphasia         0-nl    1-mild/mod 2-severe   3-mute  [0] 12:Dysarthria                     0-nl    1-mild/mod 2-severe                                                              x-untestable  [0] 13:Neglect/Inattention                   0-none  1-partial  2-complete  [2] TOTAL      Objective Data:    Labs:  Lab Results   Component Value Date/Time    PROTHROMBTM 13.4 05/06/2024 09:44 PM    INR 1.01 05/06/2024 09:44 PM      Lab Results   Component Value Date/Time    WBC 6.9 05/07/2024 12:58 AM    RBC 4.57 05/07/2024 12:58 AM    HEMOGLOBIN 14.2 05/07/2024 12:58 AM    HEMATOCRIT 39.2 05/07/2024 12:58 AM    MCV 85.8 05/07/2024 12:58 AM    MCH 31.1 05/07/2024 12:58 AM    MCHC 36.2 (H) 05/07/2024 12:58 AM     MPV 11.5 05/07/2024 12:58 AM    NEUTSPOLYS 63.60 05/07/2024 12:58 AM    LYMPHOCYTES 26.90 05/07/2024 12:58 AM    MONOCYTES 7.40 05/07/2024 12:58 AM    EOSINOPHILS 1.40 05/07/2024 12:58 AM    BASOPHILS 0.40 05/07/2024 12:58 AM      Lab Results   Component Value Date/Time    SODIUM 138 05/07/2024 12:58 AM    POTASSIUM 3.2 (L) 05/07/2024 12:58 AM    CHLORIDE 107 05/07/2024 12:58 AM    CO2 20 05/07/2024 12:58 AM    GLUCOSE 87 05/07/2024 12:58 AM    BUN 4 (L) 05/07/2024 12:58 AM    CREATININE 0.44 (L) 05/07/2024 12:58 AM      Lab Results   Component Value Date/Time    CHOLSTRLTOT 121 05/07/2024 12:58 AM    LDL 53 05/07/2024 12:58 AM    HDL 61 05/07/2024 12:58 AM    TRIGLYCERIDE 35 05/07/2024 12:58 AM       Lab Results   Component Value Date/Time    ALKPHOSPHAT 50 05/07/2024 12:58 AM    ASTSGOT 18 05/07/2024 12:58 AM    ALTSGPT 11 05/07/2024 12:58 AM    TBILIRUBIN 0.9 05/07/2024 12:58 AM        Imaging/Testing:    I interpreted and/or reviewed the patient's neuroimaging    CT-CTA NECK WITH & W/O-POST PROCESSING   Final Result         1.  CT angiogram of the neck within normal limits.         CT-CTA HEAD WITH & W/O-POST PROCESS   Final Result   Addendum (preliminary) 1 of 1   Addendum: There is subtle partial filling defect of the left P1 segment,    appearance favoring partially occlusive P1 thrombus.      These findings were discussed with the patient's clinician, Hitesh Stringer,    on 5/6/2024 11:25 PM.      Final         1.  No large vessel occlusion or aneurysm identified.      OUTSIDE IMAGES-MR BRAIN   Final Result      OUTSIDE IMAGES-MR BRAIN   Final Result      OUTSIDE IMAGES-DX CHEST   Final Result      OUTSIDE IMAGES-CT HEAD   Final Result      OUTSIDE IMAGES-CT CERVICAL SPINE   Final Result      EC-ECHOCARDIOGRAM COMPLETE W/O CONT    (Results Pending)       Assessment and Plan:  Charisma Pablo is a 28 year old woman with migraines, no other vascular risk factors, presenting with headache, R side deficits,  and aphasia, found to have an acute L thalamic stroke. Repeat CTA at [facility] redemonstrates subocclusive thrombus in L P2, possible dissection in L V3 segment.  She has been started on heparin therapy for secondary stroke prevention.  Will transition to oral anticoagulation in 1-2 days pending clinical stability.  Workup for stroke in young patient underway, but highest suspicion remains for stroke related to vertebral artery dissection.     Problem list:   Left thalamic stroke   Migraine     Plan:              - continue q4h neurochecks/NIHSS              - continue no bolus/no rebolus heparin protocol- if clinically stable over next 24 hours, will transition to oral anticoagulation              - clinical stability at normotensive BP, acutely may aim for -160, long-term goal is 110-130/60-80, currently at goal without intervention              - stroke labs:  HgbA1c 5.1 and LDL 53              - may defer statin therapy as stroke etiology unlikely athero-related process              - complete embolic workup with TTE with bubble study, and ziopatch monitoring at discharge              - f/u arterial hypercoagulable workup:  antiphospholipid panel, antithrombin III deficiency, Factor V Leiden, Prothrombin II mutation              - f/u autoimmunity screen with reflexive ELEONORA panel              - PT/OT/SLP              - on heparin for DVT chemoppx   - stroke bridge clinic follow up    The evaluation of the patient, and recommended management, was discussed with Dr. Willian Alexander. I have performed a physical exam and reviewed and updated ROS and Plan today (5/7/2024).     Hitesh Stringer MD  Vascular Neurology

## 2024-05-07 NOTE — HOSPITAL COURSE
Charisma Pablo is a 28 y.o. female who presented 5/6/2024 with past medical history of migraines who presents to the hospital for right-sided weakness, right-sided numbness, right-sided facial weakness, confusion, blurred vision and headaches that started.  This started at 11 AM when she was found by her boyfriend.  Her symptoms then improved and then the started walking outside when she started to slur her words more and unable to move the right side of her body.  Originally she was having trouble opening her left eye and has some left-sided facial droop.  According to her boyfriend all of her symptoms are improving.  He denies any fever, trauma, nausea, vomiting, diarrhea.  She denies any loss consciousness.  Patient does have a family history of heart disease and stroke     Patient was at [facility] where she had a CT and CTA of the head and neck which was negative for acute occlusion.  She had a original MRI without contrast that was negative and another MRI with contrast was repeated.  She had MRI of the brain with contrast contrast that found a focus of diffusion restriction of the left thalamus most compatible with a lacunar infarct.  Patient was started on aspirin, Plavix and statin and transferred to our facility for higher level of care.  She was evaluated by neurology and upon review of her imaging Dr Stringer was concerned about vertebral artery dissection and the patient was started on heparin drip

## 2024-05-07 NOTE — PROGRESS NOTES
Monitor Summary: SB/SR 58-62, WV 0.12, QRS 0.08, QT 0.44, with frequent trigeminal PVCs per strip from monitor room.

## 2024-05-07 NOTE — PROGRESS NOTES
Patient complaining of new onset left eye blurry vision. MD notified. Stat head CT ordered. This RN will transport the patient down. Monitor room aware.

## 2024-05-07 NOTE — PROGRESS NOTES
BRIEF ADMISSION REPORT:    This patient is being admitted to the Hospitalist Service.    Report received from RADHA Vega:Ti Reyes [facility]  Reason for Admission:thalamic stroke; Neurology   Pertinent history and findings: Patient is a 28-year-old female past medical history of migraines, who presented to the emergency department due to blurry vision and right-sided weakness.  Patient dates that approximate 11 AM this morning, she began to experience progression.  Approximately 1 PM, she began to experience right-sided weakness.  As result came to the emergency department.  CT head and angio were negative.  MRI showed thalamic infarct.  She was evaluated for thrombolytics.  She was loaded with aspirin Plavix and atorvastatin.  She has some improvement of her symptoms.  Still having persistent right-sided weakness and word finding difficulty.  Case discussed with our neurologist,  who recommended transfer   Vitals Signs reviewed and patient is stable: stable  Consults Called:consult Neurology   Hospitalist Assigned to Admit this Patient:RTOC  CODE Status:full       For any questions or concerns about this patient, please page Hospitalist Service.

## 2024-05-07 NOTE — DISCHARGE PLANNING
Please review the consult from Dr. Dsouza regarding post acute recommendations.  TCC will no longer follow.  Please reach out to myself with any questions.

## 2024-05-07 NOTE — DIETARY
Nutrition Services: Brief Update    Pt with yes to possible wound on admit screen. No wounds found in flowsheets, MD notes, and no wound team consult in place.     RD will monitor per department policy.
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
## 2024-05-07 NOTE — THERAPY
Occupational Therapy   Initial Evaluation     Patient Name: Charisma Pablo  Age:  28 y.o., Sex:  female  Medical Record #: [id number]  Today's Date: 5/7/2024     Precautions  Precautions: Fall Risk, Swallow Precautions  Comments: -160    Assessment  Patient is a 28 y.o. female with a diagnosis of acute L thalamic CVA. Additional factors influencing patient status / progress: PMHx includes migraines. During OT eval, pt demo'd ADLs and related functional mobility with SPV. Pt reports her symptoms have resolved and this is consistent with assessment and functional performance demo'd during eval. Pt's spouse is able to assist as needed. No further acute OT needs.     Plan    Occupational Therapy Initial Treatment Plan   Duration: (P) Evaluation only    DC Equipment Recommendations: (P) None  Discharge Recommendations: (P) Anticipate that the patient will have no further occupational therapy needs after discharge from the hospital     Subjective    Pt agreeable to OT eval     Objective     05/07/24 1108   Initial Contact Note    Initial Contact Note Order Received and Verified, Evaluation Only - Patient Does Not Require Further Acute Occupational Therapy at this Time.  However, May Benefit from Post Acute Therapy for Higher Level Functional Deficits.   Prior Living Situation   Prior Services Home-Independent   Housing / Facility Mobile Home   Steps Into Home 0  (ramp)   Steps In Home 0   Bathroom Set up Walk In Shower   Equipment Owned None   Lives with - Patient's Self Care Capacity Significant Other   Comments Pt reports she lives with her boyfriend who works full time   Prior Level of ADL Function   Self Feeding Independent   Grooming / Hygiene Independent   Bathing Independent   Dressing Independent   Toileting Independent   Prior Level of IADL Function   Medication Management Unable To Determine At This Time  (unknown if pt had meds to manage prior)   Laundry Independent   Kitchen Mobility Independent    Finances Independent   Home Management Independent   Shopping Independent   Prior Level Of Mobility Independent Without Device in Community   Driving / Transportation Driving Independent   Occupation (Pre-Hospital Vocational) Employed Part Time   Vitals   Pulse 86   Patient BP Position Sitting   Blood Pressure (!) 151/111   Pain   Intervention Declines   Cognition    Cognition / Consciousness WDL   Level of Consciousness Alert   Comments Pleasant and cooperative   Active ROM Upper Body   Active ROM Upper Body  WDL   Strength Upper Body   Upper Body Strength  WDL   Sensation Upper Body   Upper Extremity Sensation  WDL   Comments denies any numbness/tingling   Upper Body Muscle Tone   Upper Body Muscle Tone  WDL   Neurological Concerns   Neurological Concerns No   Coordination Upper Body   Coordination WDL   Balance Assessment   Sitting Balance (Static) Good   Sitting Balance (Dynamic) Good   Standing Balance (Static) Good   Standing Balance (Dynamic) Fair +   Weight Shift Sitting Good   Weight Shift Standing Good   Comments no AD   Bed Mobility    Sit to Supine Independent   Scooting Independent   Rolling Independent   Comments bed flat   ADL Assessment   Eating Independent   Grooming Supervision;Standing  (oral care)   Toileting   (declined need)   How much help from another person does the patient currently need...   Putting on and taking off regular lower body clothing? 3   Bathing (including washing, rinsing, and drying)? 3   Toileting, which includes using a toilet, bedpan, or urinal? 3   Putting on and taking off regular upper body clothing? 4   Taking care of personal grooming such as brushing teeth? 4   Eating meals? 4   6 Clicks Daily Activity Score 21   mRS Prior to admission   Prior to admission mRS 0   Modified Hudson (mRS)   Modified Sandrita Score 0   Functional Mobility   Sit to Stand Supervised   Bed, Chair, Wheelchair Transfer Supervised   Toilet Transfers Standby Assist   Transfer Method Stand Step    Mobility in room with no AD and SPV   Visual Perception   Comments denies any visual changes   Activity Tolerance   Sitting in Chair in chair on arrival   Standing 5 min   Education Group   Role of Occupational Therapist Patient Response Patient;Significant Other;Acceptance;Explanation;Verbal Demonstration   Occupational Therapy Initial Treatment Plan    Duration Evaluation only   Problem List   Problem List Decreased Functional Mobility   Anticipated Discharge Equipment and Recommendations   DC Equipment Recommendations None   Discharge Recommendations Anticipate that the patient will have no further occupational therapy needs after discharge from the hospital   Interdisciplinary Plan of Care Collaboration   IDT Collaboration with  Nursing   Patient Position at End of Therapy In Bed;Bed Alarm On;Call Light within Reach;Tray Table within Reach;Phone within Reach;Family / Friend in Room   Collaboration Comments RN updated   Session Information   Date / Session Number  5/7 eval only

## 2024-05-07 NOTE — PROGRESS NOTES
4 Eyes Skin Assessment Completed by DOTTIE Trimble and DOTTIE Smith.    Head WDL  Ears WDL  Nose WDL  Mouth WDL  Neck WDL  Breast/Chest WDL  Shoulder Blades WDL  Spine WDL  (R) Arm/Elbow/Hand WDL  (L) Arm/Elbow/Hand WDL  Abdomen WDL  Groin WDL  Scrotum/Coccyx/Buttocks WDL  (R) Leg WDL  (L) Leg WDL  (R) Heel/Foot/Toe WDL  (L) Heel/Foot/Toe WDL          Devices In Places Tele Box, Blood Pressure Cuff, and Pulse Ox      Interventions In Place N/A    Possible Skin Injury No    Pictures Uploaded Into Epic N/A  Wound Consult Placed N/A  RN Wound Prevention Protocol Ordered No

## 2024-05-07 NOTE — THERAPY
"Speech Language Pathology   Clinical Swallow Evaluation     Patient Name: Charisma Pablo  AGE:  28 y.o., SEX:  female  Medical Record #: [id number]  Date of Service: 2024      History of Present Illness  Charisma Pablo is a 28 y.o. female who presented 2024 for CVA. MRI brain revealed an acute L thalamic stroke.     PMHx: migraines.     Per MD in H&P: \"Chest x-ray interpreted by me found no acute pulmonary process.\"    General Information:  Vitals  O2 Delivery Device: None - Room Air  Level of Consciousness: Alert, Awake  Orientation: Self, , General place, Current month  Follows Directives: Yes      Prior Living Situation & Level of Function:  Lives with - Patient's Self Care Capacity: Alone and Able to Care For Self  Communication: WFL  Swallowing: WFL       Oral Mechanism Evaluation:  Dentition: Good, Natural dentition   Facial Symmetry: Central right facial droop  Facial Sensation: Equal     Labial Observations: WFL   Lingual Observations: Midline         Laryngeal Function:  Secretion Management: Adequate  Voice Quality: WFL      Subjective  Patient received awake, oriented to self, place, and month. Stated year was  but self corrected. Stated she was in the hospital because she hurt herself. Some word finding difficulty noted. Pt reported L sided blurry vision - RN notified. Pt denied hx of dysphagia.       Assessment  Current Method of Nutrition: NPO until cleared by speech pathology  Positioning: Gonsalez's (60-90 degrees)  Bolus Administration: Patient    O2 Delivery Device: None - Room Air  Factor(s) Affecting Performance: None  Tracheostomy : No    Swallowing Trials:  Swallowing Trials  Ice: WFL  Thin Liquid (TN0): WFL  Regular (RG7): WFL    Comments: Adequate bolus acceptance and containment. Presumed timely onset of pharyngeal swallow and bolus formation evidenced by absence of oral residue. No overt s/sx of aspiration appreciated. Voice and breath sounds remained clear. Mastication " "timely and functional with regular solids.       Clinical Impressions  No clinical signs of oropharyngeal dysphagia. Recommend initiation of regular diet with close monitoring. SLP following to ensure diet tolerance and complete orders for cognitive-linguistic evaluation, as appropriate.       Recommendations  Diet Consistency: Regular diet  Instrumentation: Instrumental swallow study pending clinical progress  Medication: Whole with liquid, As tolerated  Supervision: Independent, Assist with meal tray set up  Positioning: Fully upright and midline during oral intake  Risk Management : Small bites/sips, Slow rate of intake  Oral Care: BID       SLP Treatment Plan  Treatment Plan: Dysphagia Treatment, Patient/Family/Caregiver Training  SLP Frequency: 3x Per Week  Estimated Duration: Until Therapy Goals Met      Anticipated Discharge Needs  Discharge Recommendations: Other (Pending results of cognitive evaluation)   Therapy Recommendations Upon DC: Dysphagia Training, Patient / Family / Caregiver Education        Patient / Family Goals  Patient / Family Goal #1: \"She's been asking for water\" -SO  Short Term Goals  Short Term Goal # 1: Patient will consume a RG/TN diet with no overt s/sx of aspiration.      Uma Malave, SLP   "

## 2024-05-07 NOTE — ASSESSMENT & PLAN NOTE
Evaluated by neurology CT concerning for vertebral artery dissection.    Continue Eliquis  Continue close clinical monitoring with serial neurologic exams and if remains stable plan to discharge tomorrow morning  PT OT SLP eval's  echocardiogram normal EF negative bubble study  Zio patch on discharge
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
## 2024-05-08 LAB
ANION GAP SERPL CALC-SCNC: 10 MMOL/L (ref 7–16)
AT III ACT/NOR PPP CHRO: 108 % (ref 76–128)
BUN SERPL-MCNC: 7 MG/DL (ref 8–22)
CALCIUM SERPL-MCNC: 8.6 MG/DL (ref 8.5–10.5)
CHLORIDE SERPL-SCNC: 108 MMOL/L (ref 96–112)
CO2 SERPL-SCNC: 21 MMOL/L (ref 20–33)
CREAT SERPL-MCNC: 0.46 MG/DL (ref 0.5–1.4)
ERYTHROCYTE [DISTWIDTH] IN BLOOD BY AUTOMATED COUNT: 41 FL (ref 35.9–50)
GFR SERPLBLD CREATININE-BSD FMLA CKD-EPI: 132 ML/MIN/1.73 M 2
GLUCOSE SERPL-MCNC: 104 MG/DL (ref 65–99)
HCT VFR BLD AUTO: 37.4 % (ref 37–47)
HGB BLD-MCNC: 13.1 G/DL (ref 12–16)
MAGNESIUM SERPL-MCNC: 2 MG/DL (ref 1.5–2.5)
MCH RBC QN AUTO: 30.8 PG (ref 27–33)
MCHC RBC AUTO-ENTMCNC: 35 G/DL (ref 32.2–35.5)
MCV RBC AUTO: 88 FL (ref 81.4–97.8)
NUCLEAR IGG SER QL IA: NORMAL
PHOSPHATE SERPL-MCNC: 4.5 MG/DL (ref 2.5–4.5)
PLATELET # BLD AUTO: 194 K/UL (ref 164–446)
PMV BLD AUTO: 11.5 FL (ref 9–12.9)
POTASSIUM SERPL-SCNC: 3.7 MMOL/L (ref 3.6–5.5)
PROT C ACT/NOR PPP: 119 % (ref 83–168)
PROT S ACT/NOR PPP: 83 % (ref 57–131)
RBC # BLD AUTO: 4.25 M/UL (ref 4.2–5.4)
SODIUM SERPL-SCNC: 139 MMOL/L (ref 135–145)
UFH PPP CHRO-ACNC: 0.7 IU/ML
WBC # BLD AUTO: 5.6 K/UL (ref 4.8–10.8)

## 2024-05-08 PROCEDURE — 99233 SBSQ HOSP IP/OBS HIGH 50: CPT | Performed by: PSYCHIATRY & NEUROLOGY

## 2024-05-08 PROCEDURE — 99232 SBSQ HOSP IP/OBS MODERATE 35: CPT | Performed by: HOSPITALIST

## 2024-05-08 RX ORDER — LOSARTAN POTASSIUM 50 MG/1
50 TABLET ORAL
Status: DISCONTINUED | OUTPATIENT
Start: 2024-05-09 | End: 2024-05-10 | Stop reason: HOSPADM

## 2024-05-08 RX ADMIN — APIXABAN 5 MG: 5 TABLET, FILM COATED ORAL at 15:43

## 2024-05-08 RX ADMIN — LOSARTAN POTASSIUM 25 MG: 50 TABLET, FILM COATED ORAL at 04:33

## 2024-05-08 RX ADMIN — HEPARIN SODIUM 12 UNITS/KG/HR: 5000 INJECTION, SOLUTION INTRAVENOUS at 04:37

## 2024-05-08 ASSESSMENT — ENCOUNTER SYMPTOMS
HEADACHES: 0
SHORTNESS OF BREATH: 0
FEVER: 0
BLOOD IN STOOL: 0
BLURRED VISION: 1

## 2024-05-08 ASSESSMENT — PAIN DESCRIPTION - PAIN TYPE
TYPE: ACUTE PAIN
TYPE: ACUTE PAIN

## 2024-05-08 ASSESSMENT — FIBROSIS 4 INDEX: FIB4 SCORE: 0.79

## 2024-05-08 NOTE — PROGRESS NOTES
Neurology Progress Note  Neurohospitalist Service, [facility]    Referring Physician: Andrew Lara M.D.    No chief complaint on file.      HPI: Refer to initial documented Neurology H&P, as detailed in the patient's chart.    Interval History 5/8: No new issues.    Review of systems: In addition to what is detailed in the HPI and/or updated in the interval history, all other systems reviewed and are negative.    Past Medical History:    has no past medical history on file.    FHx:  family history is not on file.    SHx:       Medications:    Current Facility-Administered Medications:     hydrALAZINE (Apresoline) injection 20 mg, 20 mg, Intravenous, Q6HRS PRN, Andrew Lara M.D.    losartan (Cozaar) tablet 25 mg, 25 mg, Oral, Q DAY, Andrew Lara M.D., 25 mg at 05/08/24 0433    heparin infusion 25,000 units in 500 mL 0.45% NACL, 0-30 Units/kg/hr, Intravenous, Continuous, Kimberley Schulte M.D., Last Rate: 14.2 mL/hr at 05/08/24 0700, 12 Units/kg/hr at 05/08/24 0700    labetalol (Normodyne/Trandate) injection 10 mg, 10 mg, Intravenous, Q4HRS PRN, Kimberley Schulte M.D., 10 mg at 05/07/24 0957    Physical Examination:     Vitals:    05/07/24 1930 05/07/24 2309 05/08/24 0300 05/08/24 0724   BP: (!) 145/95 118/85 125/82 112/73   Pulse: 77 69 61 71   Resp: 18 18 18 16   Temp: 36.4 °C (97.6 °F) 36.7 °C (98 °F) 36.6 °C (97.9 °F) 36.4 °C (97.6 °F)   TempSrc: Temporal Temporal Temporal Temporal   SpO2: 97% 95% 94% 98%   Weight:   60.2 kg (132 lb 11.5 oz)    Height:               NEUROLOGICAL EXAM:     Patient is a very alert and oriented x 4.  Follows commands.  Repeats simple phrases.  More difficulty on more complex phrases.  Follows two-step commands.  Some hesitancy in speech.  pupils equal reactive.  Mild right facial droop.  no nystagmus.  Visual field intact.  Sustained antigravity in all 4.  Decrease sensation on the right hand compared to left.  No ataxia acutely continue to  follow supportive care    Objective Data:    Labs:  Lab Results   Component Value Date/Time    PROTHROMBTM 13.4 05/06/2024 09:44 PM    INR 1.01 05/06/2024 09:44 PM      Lab Results   Component Value Date/Time    WBC 5.6 05/08/2024 04:04 AM    RBC 4.25 05/08/2024 04:04 AM    HEMOGLOBIN 13.1 05/08/2024 04:04 AM    HEMATOCRIT 37.4 05/08/2024 04:04 AM    MCV 88.0 05/08/2024 04:04 AM    MCH 30.8 05/08/2024 04:04 AM    MCHC 35.0 05/08/2024 04:04 AM    MPV 11.5 05/08/2024 04:04 AM    NEUTSPOLYS 63.60 05/07/2024 12:58 AM    LYMPHOCYTES 26.90 05/07/2024 12:58 AM    MONOCYTES 7.40 05/07/2024 12:58 AM    EOSINOPHILS 1.40 05/07/2024 12:58 AM    BASOPHILS 0.40 05/07/2024 12:58 AM      Lab Results   Component Value Date/Time    SODIUM 139 05/08/2024 04:04 AM    POTASSIUM 3.7 05/08/2024 04:04 AM    CHLORIDE 108 05/08/2024 04:04 AM    CO2 21 05/08/2024 04:04 AM    GLUCOSE 104 (H) 05/08/2024 04:04 AM    BUN 7 (L) 05/08/2024 04:04 AM    CREATININE 0.46 (L) 05/08/2024 04:04 AM      Lab Results   Component Value Date/Time    CHOLSTRLTOT 121 05/07/2024 12:58 AM    LDL 53 05/07/2024 12:58 AM    HDL 61 05/07/2024 12:58 AM    TRIGLYCERIDE 35 05/07/2024 12:58 AM       Lab Results   Component Value Date/Time    ALKPHOSPHAT 50 05/07/2024 12:58 AM    ASTSGOT 18 05/07/2024 12:58 AM    ALTSGPT 11 05/07/2024 12:58 AM    TBILIRUBIN 0.9 05/07/2024 12:58 AM        Imaging/Testing:  EC-ECHOCARDIOGRAM COMPLETE W/O CONT   Final Result      CT-HEAD W/O   Final Result      1. Stable hypodensity in the left thalamus, compatible with known left thalamic infarct.   2. No other acute intracranial abnormality.         CT-CTA NECK WITH & W/O-POST PROCESSING   Final Result         1.  CT angiogram of the neck within normal limits.         CT-CTA HEAD WITH & W/O-POST PROCESS   Final Result   Addendum (preliminary) 1 of 1   Addendum: There is subtle partial filling defect of the left P1 segment,    appearance favoring partially occlusive P1 thrombus.      These  findings were discussed with the patient's clinician, Hitesh Stringer,    on 5/6/2024 11:25 PM.      Final         1.  No large vessel occlusion or aneurysm identified.      OUTSIDE IMAGES-MR BRAIN   Final Result      OUTSIDE IMAGES-MR BRAIN   Final Result      OUTSIDE IMAGES-DX CHEST   Final Result      OUTSIDE IMAGES-CT HEAD   Final Result      OUTSIDE IMAGES-CT CERVICAL SPINE   Final Result            Assessment and Plan:    28-year-old female presents to outside facility due to right-sided hemiparesis with aphasia found to have a left thalamic infarct.  CTA at our facility showed a left P2 subocclusive thrombus with the possibility of a left vertebral dissection at the V3 segment.  Patient is currently on heparin.  Stable examination.  Plan titration to oral anticoagulant tomorrow this afternoon.  Etiology is most likely due to left vertebral dissection.  Hypercoagulable workup is ending.    Plan:  1.  Every 4 hour neurochecks  2.  Okay to transition to all oral anticoagulation this afternoon with Eliquis.  3.  Maintain normal blood pressures.  110s to 130s systolic.  4.  Zio patch at discharge.  5.  Follow-up on hypercoagulable workup antiphospholipid panel Antithrombin, factor V, prothrombin II  6.  Follow-up in stroke clinic    Spent over 53 minutes reviewing outside imaging, CTA, MRI, examined the patient and going to care plan the primary service.    This chart was partially generated using voice recognition technology and sound alike word replacement may be present, best efforts were made to make the chart accurate.    Hitesh Lagos MD  Board Certified Neurology, ABPN  (t) [phone]

## 2024-05-08 NOTE — DISCHARGE PLANNING
Patient was referred to [facility] health worker who will see patient and provide community resources

## 2024-05-08 NOTE — CARE PLAN
The patient is Stable - Low risk of patient condition declining or worsening    Shift Goals  Clinical Goals: Monitor neuro status, <160  Patient Goals: Sleep  Family Goals: Comfort    Progress made toward(s) clinical / shift goals:  Patient is AAOx3, she is able to communicate her needs. Q4 hour neuro checks in place to monitor neuro status. Patient's BP remains stable. Bed low, locked and call light in reach.    Problem: Knowledge Deficit - Stroke Education  Goal: Patient's knowledge of stroke and risk factors will improve  Outcome: Progressing  Note: Patient educated on stroke diagnosis, medication safety, signs and symptoms of a stroke and risk factors. Patient demonstrates understanding of education provided. All questions answered.  1.  Stroke education booklet provided  2.  Education regarding EMS activation, need for follow up, medication prescribed at discharge, risk factors for stroke/lifestyle modifications, warning signs and symptoms of stroke provided     Problem: Psychosocial - Patient Condition  Goal: Patient's ability to verbalize feelings about condition will improve  Outcome: Progressing  Note: Patient is oriented x3, she is able to communicate her needs. Patient still confused about why she is in the hospital. Education provided on stroke diagnosis.     Problem: Neuro Status  Goal: Neuro status will remain stable or improve  Outcome: Progressing  Note: At shift change report, told patient was alert to self. Patient no AAOx3, she is able to articulate that she is in the hospital, the month and year, still confused about the situation.     Problem: Mobility - Stroke  Goal: Patient's capacity to carry out activities will improve  Outcome: Progressing  Note: Patient able to mobilize to the bathroom and perform ADLs, she remains free from falls.     Patient is not progressing towards the following goals: N/A

## 2024-05-08 NOTE — THERAPY
"Speech Language Pathology   Daily Treatment     Patient Name: Charisma Pablo  AGE:  28 y.o., SEX:  female  Medical Record #: [id number]  Date of Service: 5/8/2024      Precautions:  Precautions: Fall Risk       Subjective  RN cleared pt for session. Pt asleep upon arrival, easily roused. Boyfriend at bedside.       Assessment  PO of thin liquids, puree, and regular solids presented. Pt self-feeding without difficulty. Appropriate oral bolus containment and clearance. Functional mastication of solids. No overt s/sx of airway invasion across all trials. Vocal quality remained stable. No signes of esophageal dysfunction.      Clinical Impressions  Patient presents without clinical indicators concerning for oropharyngeal dysphagia. Recommend continuation of oral diet. SLP to follow likely x1 to monitor for changes given acute thalamic CVA.      Recommendations  Diet Consistency: Regular solids/thin liquids  Instrumentation: None indicated at this time  Medication: As tolerated  Supervision: Independent, Assist with meal tray set up  Positioning: Fully upright and midline during oral intake  Risk Management : Small bites/sips, Slow rate of intake  Oral Care: BID      SLP Treatment Plan  Treatment Plan: Dysphagia Treatment  SLP Frequency: 3x Per Week  Estimated Duration: Until Therapy Goals Met      Anticipated Discharge Needs  Discharge Recommendations: Recommend outpatient speech therapy services  Therapy Recommendations Upon DC: Dysphagia Training, Cognitive-Linguistic Training, Community Re-Integration, Patient / Family / Caregiver Education      Patient / Family Goals  Patient / Family Goal #1: \"She's been asking for water\" -SO  Goal #1 Outcome: Progressing as expected  Short Term Goals  Short Term Goal # 1: Patient will consume a RG/TN diet with no overt s/sx of aspiration.  Goal Outcome # 1: Progressing as expected      URBANO Stafford  "

## 2024-05-08 NOTE — DISCHARGE PLANNING
Community Health Worker Initial First Visit Intake  Social determinates of health intake complete.   Identified barriers to : Insurance.  Contact information provided to Charisma Pablo Yes  Has PCP appointment scheduled for :  Pt will call  Accepted Meds-To-Beds.    Inpatient assessment completed.    ERIS Dang met with pt (SO and family present) and introduced Community Care Management and completed SDOH. Pt lives in [place] with SO and is an active . Pt is employed and has no Insurance coverage. Pt denies any barriers with food, housing, transportation and is confident in managing her health and medications. Pending discharge disposition.     Called [facility]  [phone] and spoke with [facility] who verified that pt is pending Medi- senait coverage and provided EFRAIN # [id number].- Pt will be assigned a .   Provided all information for  and Medi-senait EFRAIN # to pt.  Will discharge pt from [facility] as all goals met and pt has no additional needs.

## 2024-05-08 NOTE — PROGRESS NOTES
[facility] Medicine Daily Progress Note    Date of Service  5/8/2024    Chief Complaint  Charisma Pablo is a 28 y.o. female admitted 5/6/2024 with blurred vision    Hospital Course  Charisma Pablo is a 28 y.o. female who presented 5/6/2024 with past medical history of migraines who presents to the hospital for right-sided weakness, right-sided numbness, right-sided facial weakness, confusion, blurred vision and headaches that started.  This started at 11 AM when she was found by her boyfriend.  Her symptoms then improved and then the started walking outside when she started to slur her words more and unable to move the right side of her body.  Originally she was having trouble opening her left eye and has some left-sided facial droop.  According to her boyfriend all of her symptoms are improving.  He denies any fever, trauma, nausea, vomiting, diarrhea.  She denies any loss consciousness.  Patient does have a family history of heart disease and stroke     Patient was at [facility] where she had a CT and CTA of the head and neck which was negative for acute occlusion.  She had a original MRI without contrast that was negative and another MRI with contrast was repeated.  She had MRI of the brain with contrast contrast that found a focus of diffusion restriction of the left thalamus most compatible with a lacunar infarct.  Patient was started on aspirin, Plavix and statin and transferred to our facility for higher level of care.  She was evaluated by neurology and upon review of her imaging Dr Stringer was concerned about vertebral artery dissection and the patient was started on heparin drip    Interval Problem Update    Patient is afebrile on room air  She remains on heparin drip  I discussed with neurology plan is to transition to [facility] this evening I discussed with pharmacist  I reviewed with patient and parents at bedside MRI findings and plan of care    I have discussed this patient's plan of care and  discharge plan at IDT rounds today with Case Management, Nursing, Nursing leadership, and other members of the IDT team.    Consultants/Specialty  neurology    Code Status  Full Code    Disposition  The patient is not medically cleared for discharge to home or a post-acute facility.  Anticipate discharge to: home with close outpatient follow-up    I have placed the appropriate orders for post-discharge needs.    Review of Systems  Review of Systems   Constitutional:  Negative for fever.   Eyes:  Positive for blurred vision.   Respiratory:  Negative for shortness of breath.    Gastrointestinal:  Negative for blood in stool.   Neurological:  Negative for headaches.        Physical Exam  Temp:  [36.4 °C (97.6 °F)-36.8 °C (98.3 °F)] 36.6 °C (97.8 °F)  Pulse:  [61-84] 84  Resp:  [16-18] 16  BP: (112-145)/(73-98) 134/94  SpO2:  [94 %-100 %] 99 %    Physical Exam  HENT:      Head: Normocephalic and atraumatic.   Cardiovascular:      Rate and Rhythm: Normal rate and regular rhythm.      Heart sounds: No murmur heard.  Pulmonary:      Effort: Pulmonary effort is normal.      Breath sounds: Normal breath sounds.   Abdominal:      General: There is no distension.      Palpations: Abdomen is soft.      Tenderness: There is no abdominal tenderness.   Musculoskeletal:         General: No swelling.   Skin:     General: Skin is warm and dry.   Neurological:      Mental Status: She is alert.      Cranial Nerves: Cranial nerve deficit present.      Motor: Weakness present.      Comments: Mild right facial droop  Right hemiparesis 4+/5   Psychiatric:         Cognition and Memory: She exhibits impaired recent memory.         Fluids    Intake/Output Summary (Last 24 hours) at 5/8/2024 1514  Last data filed at 5/7/2024 2000  Gross per 24 hour   Intake 200 ml   Output --   Net 200 ml       Laboratory  Recent Labs     05/06/24  2144 05/07/24  0058 05/08/24  0404   WBC 8.0 6.9 5.6   RBC 4.52 4.57 4.25   HEMOGLOBIN 14.0 14.2 13.1    HEMATOCRIT 39.0 39.2 37.4   MCV 86.3 85.8 88.0   MCH 31.0 31.1 30.8   MCHC 35.9* 36.2* 35.0   RDW 39.2 39.5 41.0   PLATELETCT 214 205 194   MPV 11.2 11.5 11.5     Recent Labs     05/06/24 2144 05/07/24  0058 05/08/24  0404   SODIUM 138 138 139   POTASSIUM 3.2* 3.2* 3.7   CHLORIDE 107 107 108   CO2 21 20 21   GLUCOSE 86 87 104*   BUN 4* 4* 7*   CREATININE 0.49* 0.44* 0.46*   CALCIUM 8.7 8.5 8.6     Recent Labs     05/06/24 2144   APTT 27.1   INR 1.01         Recent Labs     05/07/24  0058   TRIGLYCERIDE 35   HDL 61   LDL 53       Imaging  EC-ECHOCARDIOGRAM COMPLETE W/O CONT   Final Result      CT-HEAD W/O   Final Result      1. Stable hypodensity in the left thalamus, compatible with known left thalamic infarct.   2. No other acute intracranial abnormality.         CT-CTA NECK WITH & W/O-POST PROCESSING   Final Result         1.  CT angiogram of the neck within normal limits.         CT-CTA HEAD WITH & W/O-POST PROCESS   Final Result   Addendum (preliminary) 1 of 1   Addendum: There is subtle partial filling defect of the left P1 segment,    appearance favoring partially occlusive P1 thrombus.      These findings were discussed with the patient's clinician, Hitesh Stringer,    on 5/6/2024 11:25 PM.      Final         1.  No large vessel occlusion or aneurysm identified.      OUTSIDE IMAGES-MR BRAIN   Final Result      OUTSIDE IMAGES-MR BRAIN   Final Result      OUTSIDE IMAGES-DX CHEST   Final Result      OUTSIDE IMAGES-CT HEAD   Final Result      OUTSIDE IMAGES-CT CERVICAL SPINE   Final Result           Assessment/Plan  * Thalamic stroke (HCC)- (present on admission)  Assessment & Plan  Evaluated by neurology CT concerning for vertebral artery dissection.    Continue heparin drip and transition to Eliquis this evening  Continue close clinical monitoring with serial neurologic exams  PT OT SLP eval's  Follow-up on echocardiogram  Zio patch on discharge    Hypokalemia  Assessment & Plan  I ordered p.o. repletion and  repeat levels    Hypertension  Assessment & Plan  Start losartan  Continue as needed hydralazine and labetalol  Goal -160    Migraines  Assessment & Plan  Patient is not on a maintenance medication         VTE prophylaxis: Heparin drip    I have performed a physical exam and reviewed and updated ROS and Plan today (5/8/2024). In review of yesterday's note (5/7/2024), there are no changes except as documented above.

## 2024-05-08 NOTE — PROGRESS NOTES
Monitor summary: SR 63-92, AK 0.12, QRS 0.09, QT 0.45, with rare PVCs, per strip from monitor room.

## 2024-05-08 NOTE — THERAPY
"Speech Language Pathology   Cognitive Evaluation     Patient Name: Charisma Pablo  AGE:  28 y.o., SEX:  female  Medical Record #: [id number]  Date of Service: 2024      History of Present Illness  Charisma Pablo is a 28 y.o. female who presented 2024 for CVA. MRI brain revealed an acute L thalamic stroke.      PMHx: migraines.      Per MD in H&P: \"Chest x-ray interpreted by me found no acute pulmonary process.\"      General Information  Vitals  O2 Delivery Device: None - Room Air  Level of Consciousness: Alert, Awake   Orientation: Self,   Follows Directives: Yes - simple commands only      Prior Living Situation & Level of Function  Prior Services: Home-Independent  Housing / Facility: Mobile Home  Lives with - Patient's Self Care Capacity: Significant Other  Communication: WFL  Swallowing: [facility]       Oral Mechanism Evaluation  Dentition: Good, Natural dentition  Facial Symmetry: Central right facial droop  Facial Sensation: Equal  Labial Observations: WFL  Lingual Observations: Midline  Motor Speech: [facility]      Laryngeal Function Exam  Secretion Management: Adequate  Voice Quality: WFL      Subjective  Patient denied any acute cognitive deficits; however, pt's boyfriend endorsed noticing changes in memory. Pt agreeable to participate in SLP tasks.        Assessment  The patient was seen this date for a cognitive-linguistic evaluation. Portions of the COGNISTAT (The Neurobehavioral Cognitive Status Examination), as well as non-standardized assessments were utilized. Results are as follows:    Cognistat  Orientation: Severe   - Pt oriented to self and date of birth only. Stated \"a selective place,\" for location. Provided 2017 for date.   Attention: Average  Repetition: Average  Naming: Average  Memory: Severe   - Pt initially unable to recall words following initial presentation. However, pt recalled 4/4 words on second attempt. Following provided delay, pt unable to independently recall words. " "Accuracy did not increase with category prompts. Accuracy did increase to 2/4 with list prompts.   Calculations: Moderate   - Pt answered 1/4 questions correctly.   Similarities: Severe   - Pt provided 1/4 abstract concepts. Answers for subsequent tasks significantly off topic/incorrect.   Judgement: Severe   - Pt stated \"I don't know,\" to 2/3 questions. Stated \"run to the kitchen,\" in response to \"what would you do if a broken pipe had flooded the kitchen.\"      Informal Assessment  Command Followin-step: 3/3, 2-step: 2/3, 3-step: 1/3   - Increased difficulty with complexity. Pt forgetting final directive in 3-step commands.   Yes/No Questions: Simple: 3/3, Moderate: 3/3, Complex: 3/3      Clock Drawing  Prompt \"Draw a clock, place all of the numbers inside the [place],  and xu the hands at 10 after 11.\" Clock drawn scored as 11/13, indicating mild impairment per CLQT protocol. Errors are as follows: incorrect hand placement indicating incorrect time.      Clinical Impressions  Patient presents with moderate-severe cognitive deficits, per objective measures, suspect acute related to thalamic CVA. Formal assessments revealed mild deficits in executive functioning, moderate deficits in calculations, and severe deficits in memory and problem solving. Comprehension relatively intact however, noted increased difficulty with multi-step directions; query if complicated by memory deficits. Pt would benefit from skilled speech therapy in the acute setting and next level of care. SLP to follow.       NOTE: It is not within the scope of practice of Speech-Language Pathologists to determine patient capacity. Please defer to the physician or psych to complete this assessment.       Recommendations  Supervision Needs Upons Discharge: Direct assistance with IADLs (see below)  IADLs: Medication management, Financial management, Household chores, Appointment management, Cooking       SLP Treatment Plan  Treatment Plan: " "Dysphagia Treatment, Cognitive Treatment  SLP Frequency: 3x Per Week  Estimated Duration: Until Therapy Goals Met      Anticipated Discharge Needs  Discharge Recommendations: Recommend post-acute placement for additional speech therapy services prior to discharge home   Therapy Recommendations Upon DC: Dysphagia Training, Cognitive-Linguistic Training, Community Re-Integration, Patient / Family / Caregiver Education      Patient / Family Goals  Patient / Family Goal #1: \"She's been asking for water\" -SO  Goal #1 Outcome: Progressing as expected  Short Term Goal # 1: Patient will consume a RG/TN diet with no overt s/sx of aspiration.  Goal Outcome # 1: Progressing as expected  Short Term Goal # 2: Pt will orient x4 across all spheres with 80% accuracy with moderate assistance  Short Term Goal # 3: Pt will complete functional memory tasks with 80% accuracy and moderate cues  Short Term Goal # 4: Pt will demo appropriate problem solving and overall exectuive functional tasks with 80% accuracy and min cues      Lorna Davison, SLP  "

## 2024-05-08 NOTE — CARE PLAN
The patient is Stable - Low risk of patient condition declining or worsening    Shift Goals  Clinical Goals: Monitor neuro status, mobility  Patient Goals: Rest  Family Goals: Speak to doctors, understand symptoms of stroke    Progress made toward(s) clinical / shift goals:      Problem: Neuro Status  Goal: Neuro status will remain stable or improve  Outcome: Progressing  Note: Q4 hour neuro checks in place. Patient is alert and oriented x2. NIH completed. Neuro status remained stable.      Problem: Mobility - Stroke  Goal: Patient's capacity to carry out activities will improve  Outcome: Progressing  Note: Mobilized patient to the chair and up to the bathroom as a standby assist. Patient tolerates ambulation well.      Problem: Knowledge Deficit - Stroke Education  Goal: Patient's knowledge of stroke and risk factors will improve  Outcome: Progressing  Note: Stroke education booklet provided. Education provided on lifestyle modification, warning signs, and symptoms.        Patient is not progressing towards the following goals: N/A

## 2024-05-08 NOTE — PROGRESS NOTES
Monitor summary: SR 63-89, MA -0.14, QRS -0.08, QT -0.37, with frequent PACs, bigeminal PVCs, and frequent PVCs per strip from the monitor room.

## 2024-05-09 PROBLEM — E87.6 HYPOKALEMIA: Status: RESOLVED | Noted: 2024-05-07 | Resolved: 2024-05-09

## 2024-05-09 PROCEDURE — 99232 SBSQ HOSP IP/OBS MODERATE 35: CPT | Performed by: PSYCHIATRY & NEUROLOGY

## 2024-05-09 PROCEDURE — 99232 SBSQ HOSP IP/OBS MODERATE 35: CPT | Performed by: HOSPITALIST

## 2024-05-09 RX ADMIN — APIXABAN 5 MG: 5 TABLET, FILM COATED ORAL at 16:19

## 2024-05-09 RX ADMIN — APIXABAN 5 MG: 5 TABLET, FILM COATED ORAL at 05:10

## 2024-05-09 RX ADMIN — LOSARTAN POTASSIUM 50 MG: 50 TABLET, FILM COATED ORAL at 06:00

## 2024-05-09 ASSESSMENT — ENCOUNTER SYMPTOMS
DIZZINESS: 0
SHORTNESS OF BREATH: 0
BLURRED VISION: 1
FEVER: 0

## 2024-05-09 ASSESSMENT — FIBROSIS 4 INDEX: FIB4 SCORE: 0.84

## 2024-05-09 ASSESSMENT — PAIN DESCRIPTION - PAIN TYPE: TYPE: ACUTE PAIN

## 2024-05-09 NOTE — PROGRESS NOTES
Monitor summary: SR, HR 68-96, SD .12, QRS .08, QT .40 with freq pac, freq pvc per strip from monitor room

## 2024-05-09 NOTE — CARE PLAN
The patient is Stable - Low risk of patient condition declining or worsening    Shift Goals  Clinical Goals: Stable neuro exams  Patient Goals: Sleep  Family Goals: Talk to neurologist, discharge    Progress made toward(s) clinical / shift goals:  Patient remains confused but is easily redirectable. Patient is AAOx3, she is able to communicate her needs. Bed low, locked and call light in reach.    Problem: Optimal Care of the Stroke Patient  Goal: Optimal acute care for the stroke patient  Outcome: Progressing  Note: Patient with stroke diagnosis. Core measures in place. Patient bridged gtom heparin gtt to oral eliquis.     Problem: Knowledge Deficit - Stroke Education  Goal: Patient's knowledge of stroke and risk factors will improve  Outcome: Progressing  Note: Patient and family educated on stroke diagnosis, signs and symptoms, risk factors and medication compliance. All questions answered.  1.  Stroke education booklet provided  2.  Education regarding EMS activation, need for follow up, medication prescribed at discharge, risk factors for stroke/lifestyle modifications, warning signs and symptoms of stroke provided     Problem: Discharge Planning - Stroke  Goal: Patient’s continuum of care needs will be met  Outcome: Progressing  Note: Discharge planning in progress. Patient pending a ziopatch.  1.  Potential discharge barriers identified upon admission and throughout hospital stay  2.  Ensure appropriate referrals in place for follow up with specialists after discharge  3.  Ensure appropriate referrals in place for DMEs if applicable  4.  Collaboration with transitional care team and interdisciplinary team to meet discharge needs  5.  Involve patient and family/caregiver in prioritizing goals for discharge planning  6.  Educate patient and caregiver about discharge instructions, medications, and follow up appointments  7.  Referral placed to [facility]  8.  Assure orders and follow up appointments  are made for outpatient extended cardiac monitoring if appropriate     Patient is not progressing towards the following goals: N/A

## 2024-05-09 NOTE — CARE PLAN
The patient is Stable - Low risk of patient condition declining or worsening    Shift Goals  Clinical Goals: Stable Neuros  Patient Goals: Ambulate  Family Goals: Go Home    Progress made toward(s) clinical / shift goals:    Problem: Optimal Care of the Stroke Patient  Goal: Optimal emergency care for the stroke patient  Outcome: Progressing     Problem: Knowledge Deficit - Stroke Education  Goal: Patient's knowledge of stroke and risk factors will improve  Outcome: Progressing     Problem: Psychosocial - Patient Condition  Goal: Patient's ability to verbalize feelings about condition will improve  Outcome: Progressing     Problem: Discharge Planning - Stroke  Goal: Ensure Stroke Core Measures are met prior to discharge  Outcome: Progressing       Patient is not progressing towards the following goals:

## 2024-05-09 NOTE — PROGRESS NOTES
Monitor Summary: SR 74-82, DC 0.14, QRS 0.07, QT 0.33, with rare PVCs per strip from monitor room.

## 2024-05-09 NOTE — PROGRESS NOTES
Hospital Medicine Daily Progress Note    Date of Service  5/9/2024    Chief Complaint  Charisma Pablo is a 28 y.o. female admitted 5/6/2024 with blurred vision    Hospital Course  Charisma Pablo is a 28 y.o. female who presented 5/6/2024 with past medical history of migraines who presents to the hospital for right-sided weakness, right-sided numbness, right-sided facial weakness, confusion, blurred vision and headaches that started.  This started at 11 AM when she was found by her boyfriend.  Her symptoms then improved and then the started walking outside when she started to slur her words more and unable to move the right side of her body.  Originally she was having trouble opening her left eye and has some left-sided facial droop.  According to her boyfriend all of her symptoms are improving.  He denies any fever, trauma, nausea, vomiting, diarrhea.  She denies any loss consciousness.  Patient does have a family history of heart disease and stroke     Patient was at [facility] where she had a CT and CTA of the head and neck which was negative for acute occlusion.  She had a original MRI without contrast that was negative and another MRI with contrast was repeated.  She had MRI of the brain with contrast contrast that found a focus of diffusion restriction of the left thalamus most compatible with a lacunar infarct.  Patient was started on aspirin, Plavix and statin and transferred to our facility for higher level of care.  She was evaluated by neurology and upon review of her imaging Dr Stringer was concerned about vertebral artery dissection and the patient was started on heparin drip    Interval Problem Update    Patient is afebrile on room air  Had mild headache this morning which is now resolved  I discussed with neurology Dr. Lagos recommended monitoring 24 hours after initiation of oral anticoagulation    I have discussed this patient's plan of care and discharge plan at IDT rounds today with  Case Management, Nursing, Nursing leadership, and other members of the IDT team.    Consultants/Specialty  neurology    Code Status  Full Code    Disposition  Medically Cleared  I have placed the appropriate orders for post-discharge needs.    Review of Systems  Review of Systems   Constitutional:  Negative for fever.   Eyes:  Positive for blurred vision.   Respiratory:  Negative for shortness of breath.    Cardiovascular:  Negative for chest pain.   Neurological:  Negative for dizziness.        Physical Exam  Temp:  [36.2 °C (97.2 °F)-36.9 °C (98.4 °F)] 36.2 °C (97.2 °F)  Pulse:  [69-97] 97  Resp:  [16] 16  BP: (116-142)/(80-91) 128/89  SpO2:  [97 %-99 %] 97 %    Physical Exam  HENT:      Head: Normocephalic.   Eyes:      General:         Right eye: No discharge.         Left eye: No discharge.   Cardiovascular:      Rate and Rhythm: Normal rate.   Pulmonary:      Effort: Pulmonary effort is normal. No respiratory distress.      Breath sounds: Normal breath sounds. No rales.   Abdominal:      Palpations: Abdomen is soft.      Tenderness: There is no abdominal tenderness.   Musculoskeletal:         General: No swelling.   Neurological:      Mental Status: She is alert.      Cranial Nerves: Cranial nerve deficit present.      Motor: Weakness present.      Comments: Mild facial droop  Right hemiparesis 4+/5         Fluids    Intake/Output Summary (Last 24 hours) at 5/9/2024 1508  Last data filed at 5/8/2024 2000  Gross per 24 hour   Intake 300 ml   Output --   Net 300 ml       Laboratory  Recent Labs     05/06/24 2144 05/07/24 0058 05/08/24  0404   WBC 8.0 6.9 5.6   RBC 4.52 4.57 4.25   HEMOGLOBIN 14.0 14.2 13.1   HEMATOCRIT 39.0 39.2 37.4   MCV 86.3 85.8 88.0   MCH 31.0 31.1 30.8   MCHC 35.9* 36.2* 35.0   RDW 39.2 39.5 41.0   PLATELETCT 214 205 194   MPV 11.2 11.5 11.5     Recent Labs     05/06/24 2144 05/07/24 0058 05/08/24  0404   SODIUM 138 138 139   POTASSIUM 3.2* 3.2* 3.7   CHLORIDE 107 107 108   CO2 21 20  21   GLUCOSE 86 87 104*   BUN 4* 4* 7*   CREATININE 0.49* 0.44* 0.46*   CALCIUM 8.7 8.5 8.6     Recent Labs     05/06/24  2144   APTT 27.1   INR 1.01         Recent Labs     05/07/24  0058   TRIGLYCERIDE 35   HDL 61   LDL 53       Imaging  EC-ECHOCARDIOGRAM COMPLETE W/O CONT   Final Result      CT-HEAD W/O   Final Result      1. Stable hypodensity in the left thalamus, compatible with known left thalamic infarct.   2. No other acute intracranial abnormality.         CT-CTA NECK WITH & W/O-POST PROCESSING   Final Result         1.  CT angiogram of the neck within normal limits.         CT-CTA HEAD WITH & W/O-POST PROCESS   Final Result   Addendum (preliminary) 1 of 1   Addendum: There is subtle partial filling defect of the left P1 segment,    appearance favoring partially occlusive P1 thrombus.      These findings were discussed with the patient's clinician, Hitesh Stringer,    on 5/6/2024 11:25 PM.      Final         1.  No large vessel occlusion or aneurysm identified.      OUTSIDE IMAGES-MR BRAIN   Final Result      OUTSIDE IMAGES-MR BRAIN   Final Result      OUTSIDE IMAGES-DX CHEST   Final Result      OUTSIDE IMAGES-CT HEAD   Final Result      OUTSIDE IMAGES-CT CERVICAL SPINE   Final Result           Assessment/Plan  * Thalamic stroke (HCC)- (present on admission)  Assessment & Plan  Evaluated by neurology CT concerning for vertebral artery dissection.    Continue Eliquis  Continue close clinical monitoring with serial neurologic exams and if remains stable plan to discharge tomorrow morning  PT OT SLP eval's  echocardiogram normal EF negative bubble study  Zio patch on discharge    Hypertension  Assessment & Plan  Improved control continue losartan  Continue as needed hydralazine and labetalol  Goal -160    Migraines  Assessment & Plan  Patient is not on a maintenance medication         VTE prophylaxis: Heparin drip    I have performed a physical exam and reviewed and updated ROS and Plan today (5/9/2024).  In review of yesterday's note (5/8/2024), there are no changes except as documented above.

## 2024-05-09 NOTE — PROGRESS NOTES
Neurology Progress Note  Neurohospitalist Service, [facility]    Referring Physician: Andrew Lara M.D.    No chief complaint on file.      HPI: Refer to initial documented Neurology H&P, as detailed in the patient's chart.    Interval History 5/9: No new issues.    Review of systems: In addition to what is detailed in the HPI and/or updated in the interval history, all other systems reviewed and are negative.    Past Medical History:    has no past medical history on file.    FHx:  family history is not on file.    SHx:       Medications:    Current Facility-Administered Medications:     losartan (Cozaar) tablet 50 mg, 50 mg, Oral, Q DAY, Andrew Lara M.D., 50 mg at 05/09/24 0600    apixaban (Eliquis) tablet 5 mg, 5 mg, Oral, BID, Andrew Lara M.D., 5 mg at 05/09/24 0510    hydrALAZINE (Apresoline) injection 20 mg, 20 mg, Intravenous, Q6HRS PRN, Andrew Lara M.D.    labetalol (Normodyne/Trandate) injection 10 mg, 10 mg, Intravenous, Q4HRS PRN, Kimberley Schulte M.D., 10 mg at 05/07/24 0957    Physical Examination:     Vitals:    05/08/24 1940 05/08/24 2331 05/09/24 0347 05/09/24 0821   BP: 128/83 (!) 142/84 (!) 130/91 (!) 129/90   Pulse: 69 69 72 75   Resp: 16 16 16 16   Temp: 36.6 °C (97.8 °F) 36.6 °C (97.8 °F) 36.9 °C (98.4 °F) 36.8 °C (98.3 °F)   TempSrc: Temporal Temporal Temporal Temporal   SpO2: 99% 99% 98% 97%   Weight:   62 kg (136 lb 11 oz)    Height:               NEUROLOGICAL EXAM:     Patient is a very alert and oriented x 4.  Patient is able to repeat follows commands some hesitancy in her speech however much improved.    Pupils equal reactive.  Mild right facial droop.  no nystagmus.  Visual field intact.  Sustained antigravity in all 4.  Decrease sensation on the right hand compared to left.  No ataxia acutely continue to follow supportive care    Objective Data:    Labs:  Lab Results   Component Value Date/Time    PROTHROMBTM 13.4 05/06/2024 09:44  PM    INR 1.01 05/06/2024 09:44 PM      Lab Results   Component Value Date/Time    WBC 5.6 05/08/2024 04:04 AM    RBC 4.25 05/08/2024 04:04 AM    HEMOGLOBIN 13.1 05/08/2024 04:04 AM    HEMATOCRIT 37.4 05/08/2024 04:04 AM    MCV 88.0 05/08/2024 04:04 AM    MCH 30.8 05/08/2024 04:04 AM    MCHC 35.0 05/08/2024 04:04 AM    MPV 11.5 05/08/2024 04:04 AM    NEUTSPOLYS 63.60 05/07/2024 12:58 AM    LYMPHOCYTES 26.90 05/07/2024 12:58 AM    MONOCYTES 7.40 05/07/2024 12:58 AM    EOSINOPHILS 1.40 05/07/2024 12:58 AM    BASOPHILS 0.40 05/07/2024 12:58 AM      Lab Results   Component Value Date/Time    SODIUM 139 05/08/2024 04:04 AM    POTASSIUM 3.7 05/08/2024 04:04 AM    CHLORIDE 108 05/08/2024 04:04 AM    CO2 21 05/08/2024 04:04 AM    GLUCOSE 104 (H) 05/08/2024 04:04 AM    BUN 7 (L) 05/08/2024 04:04 AM    CREATININE 0.46 (L) 05/08/2024 04:04 AM      Lab Results   Component Value Date/Time    CHOLSTRLTOT 121 05/07/2024 12:58 AM    LDL 53 05/07/2024 12:58 AM    HDL 61 05/07/2024 12:58 AM    TRIGLYCERIDE 35 05/07/2024 12:58 AM       Lab Results   Component Value Date/Time    ALKPHOSPHAT 50 05/07/2024 12:58 AM    ASTSGOT 18 05/07/2024 12:58 AM    ALTSGPT 11 05/07/2024 12:58 AM    TBILIRUBIN 0.9 05/07/2024 12:58 AM        Imaging/Testing:  EC-ECHOCARDIOGRAM COMPLETE W/O CONT   Final Result      CT-HEAD W/O   Final Result      1. Stable hypodensity in the left thalamus, compatible with known left thalamic infarct.   2. No other acute intracranial abnormality.         CT-CTA NECK WITH & W/O-POST PROCESSING   Final Result         1.  CT angiogram of the neck within normal limits.         CT-CTA HEAD WITH & W/O-POST PROCESS   Final Result   Addendum (preliminary) 1 of 1   Addendum: There is subtle partial filling defect of the left P1 segment,    appearance favoring partially occlusive P1 thrombus.      These findings were discussed with the patient's clinician, Hitesh Stringer,    on 5/6/2024 11:25 PM.      Final         1.  No large  vessel occlusion or aneurysm identified.      OUTSIDE IMAGES-MR BRAIN   Final Result      OUTSIDE IMAGES-MR BRAIN   Final Result      OUTSIDE IMAGES-DX CHEST   Final Result      OUTSIDE IMAGES-CT HEAD   Final Result      OUTSIDE IMAGES-CT CERVICAL SPINE   Final Result            Assessment and Plan:    28-year-old female presents to outside facility due to right-sided hemiparesis with aphasia found to have a left thalamic infarct.  CTA at our facility showed a left P2 subocclusive thrombus with the possibility of a left vertebral dissection at the V3 segment.  Patient is currently on heparin.  Stable examination.  Plan titration to oral anticoagulant tomorrow this afternoon.  Etiology is most likely due to left vertebral dissection.  Hypercoagulable workup is ending.    Update 5/9  No new issues.  Patient is on oral anticoagulant Eliquis.  Tolerating well.  Her speech is vastly improved.  Able to repeat and name objects without difficulty.  Plan to either discharge late tonight or early tomorrow morning.  Patient lives in [place].  She will need to have follow-up in stroke clinic and follow-up CTAs in 3 months to make sure the vertebral artery dissection resolves.  Plan to continue the Eliquis for approximately 6 months.  Follow-up on hypercoagulable workup in the clinic.    Plan:  1.  Every 4 hour neurochecks  2.  Continue Eliquis 5 mg twice daily.  3.  Maintain normal blood pressures.  110s to 130s systolic.  4.  Zio patch at discharge.  5.  Follow-up on hypercoagulable workup antiphospholipid panel Antithrombin, factor V, prothrombin II  6.  Follow-up in stroke clinic  7.  Plan to either discharge home later tonight or early tomorrow morning.  Make sure patient has follow-up appointments in neurology clinic before discharging.        This chart was partially generated using voice recognition technology and sound alike word replacement may be present, best efforts were made to make the chart accurate.    Hitesh  MD Demond  Board Certified Neurology, ABPN  (t) [phone]

## 2024-05-09 NOTE — PROGRESS NOTES
Monitor Summary: SR 78-97, CA -0.14, QRS -0.07, QT -0.38, with rare PVC per strip from the monitor room.

## 2024-05-10 ENCOUNTER — PHARMACY VISIT (OUTPATIENT)
Dept: PHARMACY | Facility: MEDICAL CENTER | Age: 29
End: 2024-05-10
Payer: COMMERCIAL

## 2024-05-10 VITALS
WEIGHT: 128.53 LBS | DIASTOLIC BLOOD PRESSURE: 87 MMHG | TEMPERATURE: 99.3 F | RESPIRATION RATE: 17 BRPM | HEART RATE: 68 BPM | BODY MASS INDEX: 24.27 KG/M2 | SYSTOLIC BLOOD PRESSURE: 125 MMHG | OXYGEN SATURATION: 97 % | HEIGHT: 61 IN

## 2024-05-10 PROCEDURE — 99232 SBSQ HOSP IP/OBS MODERATE 35: CPT | Performed by: PSYCHIATRY & NEUROLOGY

## 2024-05-10 PROCEDURE — 99239 HOSP IP/OBS DSCHRG MGMT >30: CPT | Performed by: HOSPITALIST

## 2024-05-10 PROCEDURE — RXMED WILLOW AMBULATORY MEDICATION CHARGE: Performed by: HOSPITALIST

## 2024-05-10 RX ORDER — LOSARTAN POTASSIUM 50 MG/1
50 TABLET ORAL DAILY
Qty: 30 TABLET | Refills: 2 | Status: SHIPPED | OUTPATIENT
Start: 2024-05-11

## 2024-05-10 RX ADMIN — APIXABAN 5 MG: 5 TABLET, FILM COATED ORAL at 04:02

## 2024-05-10 RX ADMIN — LOSARTAN POTASSIUM 50 MG: 50 TABLET, FILM COATED ORAL at 04:02

## 2024-05-10 ASSESSMENT — FIBROSIS 4 INDEX: FIB4 SCORE: 0.84

## 2024-05-10 NOTE — PROGRESS NOTES
Patient arrived to [facility]. CO paper work, meds, and follow up appointments reviewed with patient. Questions addressed. Ride home with family.

## 2024-05-10 NOTE — CARE PLAN
The patient is Stable - Low risk of patient condition declining or worsening    Shift Goals  Clinical Goals: Stable neuro exam  Patient Goals: Sleep  Family Goals: Go home    Progress made toward(s) clinical / shift goals:    Problem: Neuro Status  Goal: Neuro status will remain stable or improve  Description: Target End Date:  Prior to discharge or change in level of care    Document on Neuro assessment in the Assessment flowsheet    1.  Assess and monitor neurologic status per provider order/protocol/unit policy  2.  Assess level of consciousness and orientation  3.  Assess for speech, dysarthria, dysphagia, facial symmetry  4.  Assess visual field, eye movements, gaze preference, pupil reaction and size  5.  Assess muscle strength and motor response in all four extremities  6.  Assess for sensation (numbness and tingling)  7.  Assess basic neuro reflexes (cough, gag, corneal)  8.  Identify changes in neuro status and report to provider for testing/treatment orders  Outcome: Progressing     Problem: Mobility - Stroke  Goal: Patient's capacity to carry out activities will improve  Description: Target End Date:  Prior to discharge or change in level of care    1.  Assess for barriers to mobility/activity  2.  Implement activity per interdisciplinary team recommendations  3.  Target activity level identified and patient/family/caregiver aware of goal  4.  Provide assistive devices  5.  Instruct patient/caregiver on proper use of assistive/adaptive devices  6.  Schedule activities and rest periods to decrease effects of fatigue  7.  Encourage mobilization to extent of ability  8.  Maintain proper body alignment  9.  Provide adequate pain management to allow progressive mobilization  10. Implement pace maker precautions as needed  Outcome: Progressing     Problem: Discharge Planning - Stroke  Goal: Ensure Stroke Core Measures are met prior to discharge  Description: Target End Date:  Prior to discharge or change in  level of care    1. Patient discharged on antithrombotic therapy (Ischemic Stroke)  2. Patient discharged on intensive statin if LDL is greater than or equal to 70 mg/dl (Ischemic Stroke)  3. Patient discharged on anticoagulation therapy for patients with atrial fibrillation/flutter (Ischemic Stroke)  4. Smoking education/cessation provided if applicable  Outcome: Progressing       Patient is not progressing towards the following goals:

## 2024-05-10 NOTE — PROGRESS NOTES
Monitor summary: SR/ST, HR , IN 0.13, QRS 0.07, QT 0.34 with (R)PVC per strip from monitor room

## 2024-05-10 NOTE — DISCHARGE INSTRUCTIONS
"Ischemic Stroke  Discharge Instructions    You experienced an Ischemic Stroke.  Ischemic stroke is the most common type of stroke and happens when an artery in the brain becomes blocked by a plaque fragment or blood clot. Typically, these blockages travel from the heart or larger arteries that supply the brain.  The brain needs a constant supply of blood, which carries oxygen and nutrients it needs to function.  A stroke occurs when one of these arteries to the brain is either blocked or bursts. As a result, part of the brain does not get the blood it needs, so it starts to die.     {Treatment Received:38262}    Stroke Risk Factors    You are at increased risk of having another stroke event.  See your Patient Stroke Guide to help reduce your stroke risk. These are your specific risk factors:  Previous TIAs or \"mini strokes\"     Get help right away if you have any signs of a stroke.  \"BE FAST\" is an easy way to remember the main warning signs of a stroke:  B - Balance. Dizziness, sudden trouble walking, or loss of balance.  E - Eyes. Trouble seeing or a change in how you see.  F - Face. Sudden weakness or loss of feeling in the face. The face or eyelid may droop on one side.  A - Arms. Weakness or loss of feeling in an arm. This happens all of a sudden and most often on one side of the body.  S - Speech. Sudden trouble speaking, slurred speech, or trouble understanding what people say.  T - Time. Time to call emergency services. Write down what time symptoms started.    "

## 2024-05-10 NOTE — PROGRESS NOTES
"Neurology Progress Note  Neurohospitalist Service, [facility]    Referring Physician: Andrew Lara M.D.      Interval History: No acute events overnight.    Review of systems: In addition to what is detailed in the HPI and/or updated in the interval history, all other systems reviewed and are negative.    Past Medical History, Past Surgical History and Social History reviewed and unchanged from prior    Medications:    Current Facility-Administered Medications:     losartan (Cozaar) tablet 50 mg, 50 mg, Oral, Q DAY, Andrew Lara M.D., 50 mg at 05/10/24 0402    apixaban (Eliquis) tablet 5 mg, 5 mg, Oral, BID, Andrew Lara M.D., 5 mg at 05/10/24 0402    hydrALAZINE (Apresoline) injection 20 mg, 20 mg, Intravenous, Q6HRS PRN, Andrew Lara M.D.    labetalol (Normodyne/Trandate) injection 10 mg, 10 mg, Intravenous, Q4HRS PRN, Kimberley Schulte M.D., 10 mg at 05/07/24 0957    Physical Examination:   /84   Pulse 71   Temp 37.4 °C (99.3 °F) (Temporal)   Resp 18   Ht 1.549 m (5' 0.98\")   Wt 58.3 kg (128 lb 8.5 oz)   SpO2 98%   BMI 24.30 kg/m²       General: Patient is awake and in no acute distress  Neck: There is normal range of motion  CV: Regular rate   Extremities:  Warm, dry, and intact, without peripheral lower extremity edema    NEUROLOGICAL EXAM:       Mental status: Awake, alert and fully oriented  Speech and language: Speech is clear and fluent.   Cranial nerve exam: Visual fields are full. Extraocular muscles are intact. Mild R face droop  Motor exam: There is sustained antigravity with no downward drift in bilateral arms and legs.  Decreased dexterity noted with R hand  Sensory exam: Reacts to tactile in all 4 extremities, subjective numbness in RUE  Coordination: No ataxia on bilateral finger-to-nose testing  Gait: Deferred due to patient preference        NIHSS: National Institutes of Health Stroke Scale     [0] 1a:Level of Consciousness           " 0-alert 1-drowsy   2-stupor   3-coma  [0] 1b:LOC Questions                         0-both  1-one      2-neither  [0] 1c:LOC Commands                       0-both  1-one      2-neither  [0] 2: Best Gaze                      0-nl    1-partial  2-forced  [0] 3: Visual Fields                              0-nl    1-partial  2-complete 3-bilat  [1] 4: Facial Paresis                0-nl    1-minor    2-partial  3-full  MOTOR                                              0-nl  [0] 5: Right Arm                       1-drift  [0] 6: Left Arm                                     2-some effort vs gravity  [0] 7: Right Leg                       3-no effort vs gravity  [0] 8: Left Leg                                      4-no movement                                                              x-untestable  [0] 9: Limb Ataxia                    0-abs   1-1_limb   2-2+_limbs                                                              x-untestable  [1] 10:Sensory                        0-nl    1-partial  2-dense  [0] 11:Best Language/Aphasia         0-nl    1-mild/mod 2-severe   3-mute  [0] 12:Dysarthria                     0-nl    1-mild/mod 2-severe                                                              x-untestable  [0] 13:Neglect/Inattention                   0-none  1-partial  2-complete  [2] TOTAL      Objective Data:    Labs:  Lab Results   Component Value Date/Time    PROTHROMBTM 13.4 05/06/2024 09:44 PM    INR 1.01 05/06/2024 09:44 PM      Lab Results   Component Value Date/Time    WBC 5.6 05/08/2024 04:04 AM    RBC 4.25 05/08/2024 04:04 AM    HEMOGLOBIN 13.1 05/08/2024 04:04 AM    HEMATOCRIT 37.4 05/08/2024 04:04 AM    MCV 88.0 05/08/2024 04:04 AM    MCH 30.8 05/08/2024 04:04 AM    MCHC 35.0 05/08/2024 04:04 AM    MPV 11.5 05/08/2024 04:04 AM    NEUTSPOLYS 63.60 05/07/2024 12:58 AM    LYMPHOCYTES 26.90 05/07/2024 12:58 AM    MONOCYTES 7.40 05/07/2024 12:58 AM    EOSINOPHILS 1.40 05/07/2024 12:58 AM    BASOPHILS 0.40  05/07/2024 12:58 AM      Lab Results   Component Value Date/Time    SODIUM 139 05/08/2024 04:04 AM    POTASSIUM 3.7 05/08/2024 04:04 AM    CHLORIDE 108 05/08/2024 04:04 AM    CO2 21 05/08/2024 04:04 AM    GLUCOSE 104 (H) 05/08/2024 04:04 AM    BUN 7 (L) 05/08/2024 04:04 AM    CREATININE 0.46 (L) 05/08/2024 04:04 AM      Lab Results   Component Value Date/Time    CHOLSTRLTOT 121 05/07/2024 12:58 AM    LDL 53 05/07/2024 12:58 AM    HDL 61 05/07/2024 12:58 AM    TRIGLYCERIDE 35 05/07/2024 12:58 AM       Lab Results   Component Value Date/Time    ALKPHOSPHAT 50 05/07/2024 12:58 AM    ASTSGOT 18 05/07/2024 12:58 AM    ALTSGPT 11 05/07/2024 12:58 AM    TBILIRUBIN 0.9 05/07/2024 12:58 AM        Imaging/Testing:    I interpreted and/or reviewed the patient's neuroimaging    EC-ECHOCARDIOGRAM COMPLETE W/O CONT   Final Result      CT-HEAD W/O   Final Result      1. Stable hypodensity in the left thalamus, compatible with known left thalamic infarct.   2. No other acute intracranial abnormality.         CT-CTA NECK WITH & W/O-POST PROCESSING   Final Result         1.  CT angiogram of the neck within normal limits.         CT-CTA HEAD WITH & W/O-POST PROCESS   Final Result   Addendum (preliminary) 1 of 1   Addendum: There is subtle partial filling defect of the left P1 segment,    appearance favoring partially occlusive P1 thrombus.      These findings were discussed with the patient's clinician, Hitesh Stringer,    on 5/6/2024 11:25 PM.      Final         1.  No large vessel occlusion or aneurysm identified.      OUTSIDE IMAGES-MR BRAIN   Final Result      OUTSIDE IMAGES-MR BRAIN   Final Result      OUTSIDE IMAGES-DX CHEST   Final Result      OUTSIDE IMAGES-CT HEAD   Final Result      OUTSIDE IMAGES-CT CERVICAL SPINE   Final Result          Assessment and Plan:  Charisma Pablo is a 28 year old woman with migraines, no other vascular risk factors, presenting with headache, R side deficits, and aphasia, found to have an acute L  thalamic stroke. Repeat CTA at [facility] redemonstrates subocclusive thrombus in L P2, possible dissection in L V3 segment.  Stroke etiology suspected to be thromboembolic, however we have initiated additional evaluation for alternative etiologies including cardioembolic and hypercoagulable state.  She was started on heparin therapy for secondary stroke prevention and now transitioned to oral anticoagulation.  Recommend 3 month course of therapy.       Problem list:   Left thalamic stroke   Migraine     Plan:              - continue q4h neurochecks/NIHSS, may be discharged from Stroke Neurology perspective   - continue apixaban 5mg BID for minimum of 3 months   - long-term BP goal is 110-130/60-80              - stroke labs:  HgbA1c 5.1 and LDL 53              - may defer statin therapy as stroke etiology unlikely athero-related process              - TTE without obvious embolic nidus, ziopatch monitoring at discharge              - f/u arterial hypercoagulable workup:  antiphospholipid panel, antithrombin III deficiency (normal), Factor V Leiden, Prothrombin II mutation              - autoimmunity screen with reflexive ELEONORA panel (negative)              - PT/OT/SLP              - on apixaban for DVT chemoppx   - stroke bridge clinic follow up    The evaluation of the patient, and recommended management, was discussed with Dr. Willian Alexander. I have performed a physical exam and reviewed and updated ROS and Plan today (5/10/2024).     Hitesh Stringer MD  Vascular Neurology

## 2024-05-10 NOTE — DISCHARGE SUMMARY
Discharge Summary    CHIEF COMPLAINT ON ADMISSION  No chief complaint on file.      Reason for Admission  Thalmic Stroke     Admission Date  5/6/2024    CODE STATUS  Prior    HPI & HOSPITAL COURSE     Charisma Pablo is a 28 y.o. female who presented 5/6/2024 with past medical history of migraines who presents to the hospital for right-sided weakness, right-sided numbness, right-sided facial weakness, confusion, blurred vision and headaches that started.  This started at 11 AM when she was found by her boyfriend.  Her symptoms then improved and then the started walking outside when she started to slur her words more and unable to move the right side of her body.  Originally she was having trouble opening her left eye and has some left-sided facial droop.  According to her boyfriend all of her symptoms are improving.  He denies any fever, trauma, nausea, vomiting, diarrhea.  She denies any loss consciousness.  Patient does have a family history of heart disease and stroke     Patient was at [facility] where she had a CT and CTA of the head and neck which was negative for acute occlusion.  She had a original MRI without contrast that was negative and another MRI with contrast was repeated.  She had MRI of the brain with contrast contrast that found a focus of diffusion restriction of the left thalamus most compatible with a lacunar infarct.  Patient was started on aspirin, Plavix and statin and transferred to our facility for higher level of care.  She was evaluated by neurology and upon review of her imaging Dr Stringer was concerned about vertebral artery dissection and the patient was started on heparin drip    The patient tolerated anticoagulation with heparin and was started on Eliquis on 5/8/2024.  Her neurodeficits are improving however she still has short-term memory loss.  Neurology recommended minimum 3 months of anticoagulation with apixaban.  Her echocardiogram was negative for embolic etiology.   Hypercoagulable panel was sent and so far results are negative we will need to follow-up on pending results.  Patient will be set up for Zio patch and is otherwise clinically stable for discharge with outpatient follow-up with stroke Bridge clinic      Patient was started on losartan for hypertension  Discharge instructions were reviewed with the patient' and her parents at the bedside and all questions answered   I also discussed with neurology Dr. Stringer this morning       Therefore, she is discharged in good and stable condition to home with close outpatient follow-up.    The patient met 2-midnight criteria for an inpatient stay at the time of discharge.    Discharge Date  5/10/2024    FOLLOW UP ITEMS POST DISCHARGE  Follow-up on pending factor V Leyden factor II DNA analysis  Continue Eliquis for minimum of 90 days  Follow-up on results of Zio patch  Establish with PCP and follow-up with stroke Bridge clinic  Monitor blood pressure and adjust medical therapy accordingly    DISCHARGE DIAGNOSES  Principal Problem:    Thalamic stroke (HCC) (POA: Yes)  Active Problems:    Acute CVA (cerebrovascular accident) (HCC) (POA: Yes)    Migraines (POA: Unknown)    Hypertension (POA: Unknown)  Resolved Problems:    Hypokalemia (POA: Unknown)      FOLLOW UP  No future appointments.  Primary Care    Follow up  as soon as possible      MEDICATIONS ON DISCHARGE     Medication List        START taking these medications        Instructions   Eliquis 5 MG Tabs  Generic drug: apixaban   Take 1 Tablet by mouth 2 times a day.  Dose: 5 mg     losartan 50 MG Tabs  Start taking on: May 11, 2024  Commonly known as: Cozaar   Take 1 Tablet by mouth every day.  Dose: 50 mg              Allergies  Not on File    DIET  No orders of the defined types were placed in this encounter.      ACTIVITY  As tolerated.  Weight bearing as tolerated    CONSULTATIONS  Neurology         LABORATORY  Lab Results   Component Value Date    SODIUM 139 05/08/2024     POTASSIUM 3.7 05/08/2024    CHLORIDE 108 05/08/2024    CO2 21 05/08/2024    GLUCOSE 104 (H) 05/08/2024    BUN 7 (L) 05/08/2024    CREATININE 0.46 (L) 05/08/2024        Lab Results   Component Value Date    WBC 5.6 05/08/2024    HEMOGLOBIN 13.1 05/08/2024    HEMATOCRIT 37.4 05/08/2024    PLATELETCT 194 05/08/2024        Total time of the discharge process exceeds 40 minutes.

## 2024-05-11 LAB
F2 C.20210G>A GENO BLD/T: NEGATIVE
F5 P.R506Q BLD/T QL: NEGATIVE

## 2024-05-29 ENCOUNTER — TELEPHONE (OUTPATIENT)
Dept: CARDIOLOGY | Facility: MEDICAL CENTER | Age: 29
End: 2024-05-29
Payer: COMMERCIAL

## 2024-05-29 NOTE — TELEPHONE ENCOUNTER
Toni EOS to BD for review on 5/30/24 as ADD  Preliminary findings:  Sinus rhythm,  with an avg rate of 80bpm  6 patient events corresponding to:  SR   VE(s)